# Patient Record
Sex: FEMALE | Race: WHITE | Employment: FULL TIME | URBAN - METROPOLITAN AREA
[De-identification: names, ages, dates, MRNs, and addresses within clinical notes are randomized per-mention and may not be internally consistent; named-entity substitution may affect disease eponyms.]

---

## 2017-01-21 ENCOUNTER — LAB ENCOUNTER (OUTPATIENT)
Dept: LAB | Age: 52
End: 2017-01-21
Attending: FAMILY MEDICINE
Payer: COMMERCIAL

## 2017-01-21 ENCOUNTER — HOSPITAL ENCOUNTER (OUTPATIENT)
Dept: ULTRASOUND IMAGING | Age: 52
Discharge: HOME | End: 2017-01-21
Attending: FAMILY MEDICINE
Payer: COMMERCIAL

## 2017-01-21 DIAGNOSIS — E78.00 HIGH CHOLESTEROL: ICD-10-CM

## 2017-01-21 DIAGNOSIS — R79.89 ELEVATED LIVER FUNCTION TESTS: ICD-10-CM

## 2017-01-21 LAB
ALBUMIN SERPL BCP-MCNC: 4 G/DL (ref 3.5–4.8)
ALBUMIN/GLOB SERPL: 1.3 {RATIO} (ref 1–2)
ALP SERPL-CCNC: 56 U/L (ref 32–100)
ALT SERPL-CCNC: 22 U/L (ref 14–54)
ANION GAP SERPL CALC-SCNC: 8 MMOL/L (ref 0–18)
AST SERPL-CCNC: 22 U/L (ref 15–41)
BASOPHILS # BLD: 0 K/UL (ref 0–0.2)
BASOPHILS NFR BLD: 1 %
BILIRUB SERPL-MCNC: 0.8 MG/DL (ref 0.3–1.2)
BUN SERPL-MCNC: 13 MG/DL (ref 8–20)
BUN/CREAT SERPL: 17.3 (ref 10–20)
CALCIUM SERPL-MCNC: 9.3 MG/DL (ref 8.5–10.5)
CHLORIDE SERPL-SCNC: 106 MMOL/L (ref 95–110)
CHOLEST SERPL-MCNC: 164 MG/DL (ref 110–200)
CO2 SERPL-SCNC: 25 MMOL/L (ref 22–32)
CREAT SERPL-MCNC: 0.75 MG/DL (ref 0.5–1.5)
EOSINOPHIL # BLD: 0 K/UL (ref 0–0.7)
EOSINOPHIL NFR BLD: 1 %
ERYTHROCYTE [DISTWIDTH] IN BLOOD BY AUTOMATED COUNT: 13.6 % (ref 11–15)
GLOBULIN PLAS-MCNC: 3 G/DL (ref 2.5–3.7)
GLUCOSE SERPL-MCNC: 91 MG/DL (ref 70–99)
HCT VFR BLD AUTO: 40.8 % (ref 35–48)
HDLC SERPL-MCNC: 61 MG/DL
HGB BLD-MCNC: 13.5 G/DL (ref 12–16)
LDLC SERPL CALC-MCNC: 94 MG/DL (ref 0–99)
LYMPHOCYTES # BLD: 1.4 K/UL (ref 1–4)
LYMPHOCYTES NFR BLD: 49 %
MCH RBC QN AUTO: 31.5 PG (ref 27–32)
MCHC RBC AUTO-ENTMCNC: 33 G/DL (ref 32–37)
MCV RBC AUTO: 95.6 FL (ref 80–100)
MONOCYTES # BLD: 0.3 K/UL (ref 0–1)
MONOCYTES NFR BLD: 12 %
NEUTROPHILS # BLD AUTO: 1.1 K/UL (ref 1.8–7.7)
NEUTROPHILS NFR BLD: 37 %
NONHDLC SERPL-MCNC: 103 MG/DL
OSMOLALITY UR CALC.SUM OF ELEC: 288 MOSM/KG (ref 275–295)
PLATELET # BLD AUTO: 244 K/UL (ref 140–400)
PMV BLD AUTO: 9.5 FL (ref 7.4–10.3)
POTASSIUM SERPL-SCNC: 3.8 MMOL/L (ref 3.3–5.1)
PROT SERPL-MCNC: 7 G/DL (ref 5.9–8.4)
RBC # BLD AUTO: 4.26 M/UL (ref 3.7–5.4)
SODIUM SERPL-SCNC: 139 MMOL/L (ref 136–144)
TRIGL SERPL-MCNC: 44 MG/DL (ref 1–149)
WBC # BLD AUTO: 2.8 K/UL (ref 4–11)

## 2017-01-21 PROCEDURE — 85025 COMPLETE CBC W/AUTO DIFF WBC: CPT

## 2017-01-21 PROCEDURE — 36415 COLL VENOUS BLD VENIPUNCTURE: CPT

## 2017-01-21 PROCEDURE — 76705 ECHO EXAM OF ABDOMEN: CPT

## 2017-01-21 PROCEDURE — 80061 LIPID PANEL: CPT

## 2017-01-21 PROCEDURE — 80053 COMPREHEN METABOLIC PANEL: CPT

## 2017-01-24 ENCOUNTER — TELEPHONE (OUTPATIENT)
Dept: FAMILY MEDICINE CLINIC | Facility: CLINIC | Age: 52
End: 2017-01-24

## 2017-01-24 DIAGNOSIS — D72.819 LEUKOPENIA, UNSPECIFIED TYPE: Primary | ICD-10-CM

## 2017-01-24 DIAGNOSIS — R79.89 ELEVATED LIVER FUNCTION TESTS: ICD-10-CM

## 2017-01-24 NOTE — TELEPHONE ENCOUNTER
Spoke to patient; [place] airport [place]. Patient informed of results, and given MD's recommendations. Patient verbalized understanding with intent to comply. Questions answered.  Upon her return, she is to have hiv and hepatit

## 2017-01-24 NOTE — TELEPHONE ENCOUNTER
Notes Recorded by Steven Merritt DO on 1/24/2017 at 1:19 PM  Patient has benign hemangioma of the liver which is something she has had for a while.  It was on the MRI she had in 2014. Katty Graft is nothing to be done about it, as it does not cause harm.

## 2017-01-24 NOTE — TELEPHONE ENCOUNTER
----- Message from Amanda Thompson DO sent at 1/24/2017  1:21 PM CST -----  White count is low. This can be due to viral illnesses. Problem is she had a mildly low white count 11 months ago.   I would check HIV test hepatitis testing and send the patie

## 2017-02-08 ENCOUNTER — APPOINTMENT (OUTPATIENT)
Dept: LAB | Age: 52
End: 2017-02-08
Attending: FAMILY MEDICINE
Payer: COMMERCIAL

## 2017-02-08 DIAGNOSIS — R79.89 ELEVATED LIVER FUNCTION TESTS: ICD-10-CM

## 2017-02-08 LAB — FERRITIN SERPL IA-MCNC: 48 NG/ML (ref 11–307)

## 2017-02-08 PROCEDURE — 36415 COLL VENOUS BLD VENIPUNCTURE: CPT

## 2017-02-08 PROCEDURE — 80074 ACUTE HEPATITIS PANEL: CPT

## 2017-02-08 PROCEDURE — 82728 ASSAY OF FERRITIN: CPT

## 2017-02-08 PROCEDURE — 87389 HIV-1 AG W/HIV-1&-2 AB AG IA: CPT

## 2017-02-09 LAB
HAV IGM SERPL QL IA: NONREACTIVE
HBV CORE IGM SERPL QL IA: NONREACTIVE
HBV SURFACE AG SERPL QL IA: NONREACTIVE
HCV AB SERPL QL IA: NONREACTIVE
HIV1+2 AB SERPL QL IA: NONREACTIVE

## 2017-03-17 RX ORDER — LEVOTHYROXINE SODIUM 0.05 MG/1
TABLET ORAL
Qty: 30 TABLET | Refills: 11 | Status: SHIPPED | OUTPATIENT
Start: 2017-03-17 | End: 2017-07-20

## 2017-03-17 RX ORDER — LOSARTAN POTASSIUM 50 MG/1
TABLET ORAL
Qty: 30 TABLET | Refills: 11 | Status: SHIPPED | OUTPATIENT
Start: 2017-03-17 | End: 2018-03-22

## 2017-05-16 ENCOUNTER — TELEPHONE (OUTPATIENT)
Dept: NEUROLOGY | Facility: CLINIC | Age: 52
End: 2017-05-16

## 2017-05-31 ENCOUNTER — LAB ENCOUNTER (OUTPATIENT)
Dept: LAB | Facility: HOSPITAL | Age: 52
End: 2017-05-31
Attending: OBSTETRICS & GYNECOLOGY
Payer: COMMERCIAL

## 2017-05-31 ENCOUNTER — TELEPHONE (OUTPATIENT)
Dept: OBGYN CLINIC | Facility: CLINIC | Age: 52
End: 2017-05-31

## 2017-05-31 DIAGNOSIS — R30.9 PAINFUL URINATION: ICD-10-CM

## 2017-05-31 DIAGNOSIS — R30.9 PAINFUL URINATION: Primary | ICD-10-CM

## 2017-05-31 PROCEDURE — 81001 URINALYSIS AUTO W/SCOPE: CPT

## 2017-05-31 PROCEDURE — 87088 URINE BACTERIA CULTURE: CPT

## 2017-05-31 PROCEDURE — 87186 SC STD MICRODIL/AGAR DIL: CPT

## 2017-05-31 PROCEDURE — 87086 URINE CULTURE/COLONY COUNT: CPT

## 2017-05-31 NOTE — TELEPHONE ENCOUNTER
Pt has been having vaginal for past few days, she states it feels like her \" insides are falling out\" she has to urinate a lot and cramping she would like to know can she see Dr. Arminda Marin on Friday no openings, pt cannot come in tomorrow she will not be in t

## 2017-05-31 NOTE — TELEPHONE ENCOUNTER
Pt states it feels like she has an UTI. She states she has pressure when she urinates, urgency, frequency, cramping with urination, painful urination, and lower back pain. Denies an odor and a fever. Denies any blood. Pt will do an UA with reflex.   Inf

## 2017-06-01 ENCOUNTER — TELEPHONE (OUTPATIENT)
Dept: OBGYN CLINIC | Facility: CLINIC | Age: 52
End: 2017-06-01

## 2017-06-01 NOTE — TELEPHONE ENCOUNTER
Pt informed of [facility] recs & verbalized understanding. Pt asking if UA results are in yet. Pt informed that UA results are in & results will be sent to [address] [place] [address] to review. Pt stated that if results are in, is it necessary for her to come in for an appt still.  Pt

## 2017-06-02 RX ORDER — SULFAMETHOXAZOLE AND TRIMETHOPRIM 800; 160 MG/1; MG/1
1 TABLET ORAL 2 TIMES DAILY
Qty: 6 TABLET | Refills: 0 | Status: SHIPPED | OUTPATIENT
Start: 2017-06-02 | End: 2017-06-05

## 2017-06-02 NOTE — TELEPHONE ENCOUNTER
Pt informed of recs below and verbalized understanding. Spoke with pt in regards to e-coli UTI and that pt needs to be sure she is wiping from front to back. Pt verbalized understanding.   Pt instructed to call office if no relief of sx's after finishing ba

## 2017-06-02 NOTE — TELEPHONE ENCOUNTER
LMTCB. Pts urine culture shows e-coli UTI. Reviewed with NJG in office and verbal given for pt to start Bactrim DS BID x 3 days. NJG stated that we need to review with pt e-coli can be caused from wiping inappropriately & from rectal IC.  Pt needs to be edu

## 2017-07-20 ENCOUNTER — OFFICE VISIT (OUTPATIENT)
Dept: OBGYN CLINIC | Facility: CLINIC | Age: 52
End: 2017-07-20

## 2017-07-20 VITALS
DIASTOLIC BLOOD PRESSURE: 74 MMHG | WEIGHT: 155 LBS | BODY MASS INDEX: 32.54 KG/M2 | HEART RATE: 69 BPM | SYSTOLIC BLOOD PRESSURE: 111 MMHG | HEIGHT: 58 IN

## 2017-07-20 DIAGNOSIS — Z78.0 MENOPAUSE: ICD-10-CM

## 2017-07-20 DIAGNOSIS — Z01.419 ENCOUNTER FOR GYNECOLOGICAL EXAMINATION WITHOUT ABNORMAL FINDING: Primary | ICD-10-CM

## 2017-07-20 DIAGNOSIS — Z12.31 VISIT FOR SCREENING MAMMOGRAM: ICD-10-CM

## 2017-07-20 PROCEDURE — 99396 PREV VISIT EST AGE 40-64: CPT | Performed by: OBSTETRICS & GYNECOLOGY

## 2017-07-20 PROCEDURE — 99213 OFFICE O/P EST LOW 20 MIN: CPT | Performed by: OBSTETRICS & GYNECOLOGY

## 2017-07-20 RX ORDER — ESTRADIOL 0.05 MG/D
1 FILM, EXTENDED RELEASE TRANSDERMAL
Qty: 8 PATCH | Refills: 1 | Status: SHIPPED | OUTPATIENT
Start: 2017-07-20 | End: 2017-08-24

## 2017-07-20 NOTE — PROGRESS NOTES
Lorenz Aschoff is a 47year old female  No LMP recorded. Patient is not currently having periods (Reason: Menopause). Patient presents with:  Gyn Exam: ANNUAL EXAM  Menopause: bad hotflashes. Tried over the counter meds w/ o success.  Taking trazad COLONOSCOPY  1998: KNEE ARTHROSCOPY Right      Comment: R knee arthroscopy  7/2008: MAMMOGRAM, BOTH BREASTS      Comment: abnormal mammogram, breast cyst  7/2006: MAMMOGRAM, BOTH BREASTS      Comment: abnormal mammogram, aspiration of breast cyst  7/2006: TraZODone HCl (DESYREL) 50 MG Oral Tab, 50 mg nightly., Disp: , Rfl:   •  Levothyroxine Sodium (SYNTHROID) 50 MCG Oral Tab, , Disp: , Rfl:     ALLERGIES:  No Known Allergies      Review of Systems:  Constitutional:  Denies fatigue, night sweats, hot flashe Assessment & Plan:  Maldonado Mclean was seen today for gyn exam and menopause.     Diagnoses and all orders for this visit:    Encounter for gynecological examination without abnormal finding    Menopause    Visit for screening mammogram  -     TELMA SCREENING BILAT

## 2017-08-08 ENCOUNTER — TELEPHONE (OUTPATIENT)
Dept: OBGYN CLINIC | Facility: CLINIC | Age: 52
End: 2017-08-08

## 2017-08-08 NOTE — TELEPHONE ENCOUNTER
Pt states the patch caused her a constant HA and she was squinting for 3 days straight. Pt started taking Vitamin E and that \"seems to be helping\". Pt states she does not want to take anything right now and does not want to be seen.  Pt encouraged to call

## 2017-08-08 NOTE — TELEPHONE ENCOUNTER
Pt cancelled appt for f/u on 8/31 with NJG [place] 11:40am. States that \"the patch was too much I had to take it off and im dealing with it now\" pt is taking vitamin E and it seems to be helping her.

## 2017-08-24 ENCOUNTER — OFFICE VISIT (OUTPATIENT)
Dept: NEUROLOGY | Facility: CLINIC | Age: 52
End: 2017-08-24

## 2017-08-24 VITALS
WEIGHT: 148 LBS | BODY MASS INDEX: 29.06 KG/M2 | RESPIRATION RATE: 16 BRPM | SYSTOLIC BLOOD PRESSURE: 112 MMHG | HEIGHT: 60 IN | DIASTOLIC BLOOD PRESSURE: 76 MMHG | HEART RATE: 76 BPM

## 2017-08-24 DIAGNOSIS — M54.16 LUMBAR RADICULOPATHY: Primary | ICD-10-CM

## 2017-08-24 PROCEDURE — 99214 OFFICE O/P EST MOD 30 MIN: CPT | Performed by: OTHER

## 2017-08-24 RX ORDER — PREGABALIN 75 MG/1
75 CAPSULE ORAL 2 TIMES DAILY
Qty: 60 CAPSULE | Refills: 5 | Status: SHIPPED | OUTPATIENT
Start: 2017-08-24 | End: 2017-09-23

## 2017-08-24 NOTE — PROGRESS NOTES
Shelia Greer : 1965     HPI:   Patient presents with:  Numbness: LOV: 5/19/15. F/U on left leg pain.  Patient states that she has been experiencing pain in left calf for years, she feels it may be coming from her back as she has soreness in her current facility-administered medications for this visit.     Past Medical History:   Diagnosis Date   • Abnormal mammogram 2008   • Abnormal mammogram 2006    aspiration  of breast cyst    • ACL tear     1998 R knee arthroscopy   • Anxiety state, unspecifi education:                 Number of children:               Social History Main Topics    Smoking status: Never Smoker                                                                Smokeless tobacco: Never Used                        Alcohol use:  Yes and pin prick in the UE and LE. Stereognosis intact  DTR: 2+ in the upper and lower extremities,  No Babinski, no hoffmans, no clonus  Coordination: Finger to nose, heel to shin intact bilaterally. Gait: Narrow based, negative Romberg’s sign.  Can stand

## 2017-08-28 ENCOUNTER — TELEPHONE (OUTPATIENT)
Dept: NEUROLOGY | Facility: CLINIC | Age: 52
End: 2017-08-28

## 2017-08-28 DIAGNOSIS — G62.9 NEUROPATHY: ICD-10-CM

## 2017-08-28 DIAGNOSIS — M48.061 LUMBAR STENOSIS: ICD-10-CM

## 2017-08-28 DIAGNOSIS — G57.30 PERONEAL NEUROPATHY, UNSPECIFIED LATERALITY: ICD-10-CM

## 2017-08-28 DIAGNOSIS — M54.16 LUMBAR RADICULOPATHY: ICD-10-CM

## 2017-08-28 RX ORDER — GABAPENTIN 100 MG/1
100 CAPSULE ORAL 3 TIMES DAILY
Qty: 90 CAPSULE | Refills: 1 | Status: SHIPPED | OUTPATIENT
Start: 2017-08-28 | End: 2018-08-21 | Stop reason: ALTCHOICE

## 2017-08-28 NOTE — TELEPHONE ENCOUNTER
Spoke to patient. She states that Lyrica is too costly for her so she would like to go back on gabapentin. She states that previously she was taking 4 caps nightly which made her sleepy but she would like to try the medication again.  She is not sure how of

## 2017-10-23 ENCOUNTER — TELEPHONE (OUTPATIENT)
Dept: OBGYN CLINIC | Facility: CLINIC | Age: 52
End: 2017-10-23

## 2017-10-23 NOTE — TELEPHONE ENCOUNTER
C/O DEVELOPED A LUMP INSIDE THE VAGINA THAT HAS NEVER BEEN THERE BEFORE. STATES IT IS NOT PAINFUL BUT SHE IS CONCERNED. ASKED FOR AN APPT IN [place] SO I SCHEDULED HER FOR THUR AT [place] WITH JULIO CESAR.

## 2017-10-26 ENCOUNTER — OFFICE VISIT (OUTPATIENT)
Dept: OBGYN CLINIC | Facility: CLINIC | Age: 52
End: 2017-10-26

## 2017-10-26 VITALS — HEART RATE: 70 BPM | DIASTOLIC BLOOD PRESSURE: 83 MMHG | SYSTOLIC BLOOD PRESSURE: 146 MMHG

## 2017-10-26 DIAGNOSIS — N81.11 CYSTOCELE, MIDLINE: Primary | ICD-10-CM

## 2017-10-26 PROCEDURE — 99213 OFFICE O/P EST LOW 20 MIN: CPT | Performed by: OBSTETRICS & GYNECOLOGY

## 2017-10-30 NOTE — PROGRESS NOTES
Maddi Nicole is a 47year old female  No LMP recorded. Patient is not currently having periods (Reason: Menopause). Patient presents with:  Gyn Problem: GENITAL LUMP -- painless -- noted while cleaning self  .      OBSTETRICS HISTORY:  Obstetric mammogram  Obstetric History     T2    L2    SAB0  TAB0  Ectopic0  Multiple0  Live Births2        SOCIAL HISTORY:    Social History  Social History   Marital status:   Spouse name: N/A    Years of education: N/A  Number of children: N/A nourished  Head/Face: normocephalic  Abdomen:  soft, nontender, nondistended, no masses  Skin/Hair: no unusual rashes or bruises  Extremities: no edema, no cyanosis  Psychiatric:  Oriented to time, place, person and situation.  Appropriate mood and affect

## 2017-11-20 ENCOUNTER — TELEPHONE (OUTPATIENT)
Dept: OBGYN CLINIC | Facility: CLINIC | Age: 52
End: 2017-11-20

## 2017-11-20 NOTE — TELEPHONE ENCOUNTER
PER PT STATE SHE HAS A YEAST INFECTION / PT WANT TO KNOW IF DR COULD PRESCRIBE MEDICATION FOR THAT ONE  PILL FOR YEAST INFECTION  / PLS ADV

## 2017-11-20 NOTE — TELEPHONE ENCOUNTER
I do not treat just vaginal discharge. If worried re: future yeast infection, can take live yogurt orally to treat.

## 2017-11-20 NOTE — TELEPHONE ENCOUNTER
Pt calling to report that her  was on a high dose of antibiotics and got a yeast infection and now pt has one. Pt stated that her  took \"one pink pill\" to get rid of yeast infection and pt would like diflucan as well.  Pt stated she has vagi

## 2017-11-20 NOTE — TELEPHONE ENCOUNTER
Pt informed of NJGs recs below and verbalized understanding. Encouraged pt to call if notices any vaginal odor or itching along with discharge. Pt verbalized understanding.

## 2017-12-11 ENCOUNTER — NURSE TRIAGE (OUTPATIENT)
Dept: FAMILY MEDICINE CLINIC | Facility: CLINIC | Age: 52
End: 2017-12-11

## 2017-12-11 ENCOUNTER — OFFICE VISIT (OUTPATIENT)
Dept: FAMILY MEDICINE CLINIC | Facility: CLINIC | Age: 52
End: 2017-12-11

## 2017-12-11 VITALS
TEMPERATURE: 99 F | BODY MASS INDEX: 30.43 KG/M2 | SYSTOLIC BLOOD PRESSURE: 119 MMHG | WEIGHT: 155 LBS | DIASTOLIC BLOOD PRESSURE: 78 MMHG | HEIGHT: 60 IN | HEART RATE: 81 BPM

## 2017-12-11 DIAGNOSIS — R05.9 COUGH: ICD-10-CM

## 2017-12-11 DIAGNOSIS — J40 BRONCHITIS: Primary | ICD-10-CM

## 2017-12-11 PROCEDURE — 99212 OFFICE O/P EST SF 10 MIN: CPT | Performed by: FAMILY MEDICINE

## 2017-12-11 PROCEDURE — 99213 OFFICE O/P EST LOW 20 MIN: CPT | Performed by: FAMILY MEDICINE

## 2017-12-11 RX ORDER — ALBUTEROL SULFATE 90 UG/1
2 AEROSOL, METERED RESPIRATORY (INHALATION) EVERY 6 HOURS PRN
Qty: 1 INHALER | Refills: 0 | Status: SHIPPED | OUTPATIENT
Start: 2017-12-11 | End: 2018-01-05

## 2017-12-11 RX ORDER — PREDNISONE 20 MG/1
TABLET ORAL
Qty: 10 TABLET | Refills: 0 | Status: SHIPPED | OUTPATIENT
Start: 2017-12-11 | End: 2018-01-05

## 2017-12-11 NOTE — TELEPHONE ENCOUNTER
Action Requested: Summary for Provider     []  Critical Lab, Recommendations Needed  [] Need Additional Advice  []   FYI    []   Need Orders  [] Need Medications Sent to Pharmacy  []  Other     SUMMARY: OV CREATED, cough, fever, brown sputum    Pt states f

## 2017-12-11 NOTE — PATIENT INSTRUCTIONS
Take the prednisone either at breakfast or lunch. Avoided at night otherwise he will have a hard time sleeping. While you are sick take the Ventolin and do 2 puffs 3 times daily as needed.   Go ahead and take her cough medicine at nighttime per Dr. Cherie Alfred

## 2017-12-11 NOTE — PROGRESS NOTES
Patient ID: Kwaku Bernal is a 47year old female.     HPI  Patient presents with:  Cough  Fever    Action Requested: Summary for Provider     []  Critical Lab, Recommendations Needed  [] Need Additional Advice  []   FYI    []   Need Orders  [] Need Med (started that way. ). Respiratory: Positive for cough and wheezing (last week only. ). Negative for shortness of breath. Cardiovascular: Negative for chest pain.          Past Medical History:   Diagnosis Date   • Abnormal mammogram 2008   • Abnormal Levothyroxine Sodium (SYNTHROID) 50 MCG Oral Tab Take 50 mcg by mouth before breakfast.   Disp:  Rfl:    Sertraline HCl (ZOLOFT) 50 MG Oral Tab 50 mg daily. Disp:  Rfl:    TraZODone HCl (DESYREL) 50 MG Oral Tab 50 mg nightly.  Disp:  Rfl:      Allergies:N and try her on prednisone and albuterol. She has a cough syrup at home already. I told her if she is not feeling much better by Wednesday or Thursday to please let me or Dr. Naz Scott know.   Cough  -     Albuterol Sulfate  (90 Base) MCG/ACT Inhalat

## 2017-12-14 ENCOUNTER — TELEPHONE (OUTPATIENT)
Dept: OTHER | Age: 52
End: 2017-12-14

## 2017-12-14 RX ORDER — DOXYCYCLINE HYCLATE 100 MG/1
100 CAPSULE ORAL 2 TIMES DAILY
Qty: 20 CAPSULE | Refills: 0 | Status: SHIPPED | OUTPATIENT
Start: 2017-12-14 | End: 2017-12-24

## 2017-12-14 NOTE — TELEPHONE ENCOUNTER
I sent in doxycycline 100 mg twice daily for 10 days which is an antibiotic. She still does not feel better within about a week she should be seen.

## 2017-12-14 NOTE — TELEPHONE ENCOUNTER
Please reply to pool: EM RN TRIAGE    Patient asking for an antibiotic      Patient was seen on 12/11/17 and stated she  is at the end of the Prednisone and is not feeling better. Continues to have sinus congestion and coughing up brown phlegm.    Pritesh

## 2017-12-15 ENCOUNTER — TELEPHONE (OUTPATIENT)
Dept: OTHER | Age: 52
End: 2017-12-15

## 2017-12-15 NOTE — TELEPHONE ENCOUNTER
Pt requesting a refill of cheratussin - ac  For her cough. Would like it to be sent to [facility] on file    LOV 12/11/17  Bronchitis    If approved Please respond to pool: STEPHANIE ESCALANTEO LPN/BOY so can be phoned in.

## 2017-12-16 RX ORDER — CODEINE PHOSPHATE AND GUAIFENESIN 10; 100 MG/5ML; MG/5ML
5 SOLUTION ORAL EVERY 6 HOURS PRN
Qty: 120 ML | Refills: 0 | OUTPATIENT
Start: 2017-12-16 | End: 2018-01-05

## 2017-12-18 NOTE — TELEPHONE ENCOUNTER
Called [facility] and verified Cheratussin AC has not been called in or faxed yet. Verbal given to Shaw Guthrie at [facility] on file. Pt informed.

## 2017-12-20 ENCOUNTER — TELEPHONE (OUTPATIENT)
Dept: OTHER | Age: 52
End: 2017-12-20

## 2017-12-20 NOTE — TELEPHONE ENCOUNTER
Pt was seen 12/11 Dx cough/cold(bronchitis)  Tx Doxycycline have  taken 7 days but becomes very nauseated after taking it,vomited on Saturday / Sunday ,nauseated today,coughing while speaking   Asking if she needs another ABX or was that enough converge

## 2017-12-20 NOTE — TELEPHONE ENCOUNTER
Pt returning call and informed of DBC's response below. Pt states has had slight improvement in productive cough.  Was asking pt further questions to give [facility] update on symptoms to see if another antibiotic might be appropriate, but pt then hurriedly stated

## 2017-12-28 ENCOUNTER — APPOINTMENT (OUTPATIENT)
Dept: GENERAL RADIOLOGY | Facility: HOSPITAL | Age: 52
End: 2017-12-28
Attending: PHYSICIAN ASSISTANT
Payer: COMMERCIAL

## 2017-12-28 ENCOUNTER — APPOINTMENT (OUTPATIENT)
Dept: CT IMAGING | Facility: HOSPITAL | Age: 52
End: 2017-12-28
Attending: PHYSICIAN ASSISTANT
Payer: COMMERCIAL

## 2017-12-28 ENCOUNTER — HOSPITAL ENCOUNTER (EMERGENCY)
Facility: HOSPITAL | Age: 52
Discharge: HOME | End: 2017-12-28
Attending: PHYSICIAN ASSISTANT
Payer: COMMERCIAL

## 2017-12-28 VITALS
WEIGHT: 155 LBS | HEART RATE: 63 BPM | RESPIRATION RATE: 18 BRPM | TEMPERATURE: 97 F | BODY MASS INDEX: 30 KG/M2 | SYSTOLIC BLOOD PRESSURE: 121 MMHG | DIASTOLIC BLOOD PRESSURE: 95 MMHG | OXYGEN SATURATION: 96 %

## 2017-12-28 DIAGNOSIS — S16.1XXA STRAIN OF NECK MUSCLE, INITIAL ENCOUNTER: ICD-10-CM

## 2017-12-28 DIAGNOSIS — S09.90XA CLOSED HEAD INJURY WITHOUT LOSS OF CONSCIOUSNESS, INITIAL ENCOUNTER: ICD-10-CM

## 2017-12-28 DIAGNOSIS — S39.012A LUMBAR STRAIN, INITIAL ENCOUNTER: Primary | ICD-10-CM

## 2017-12-28 PROCEDURE — 72040 X-RAY EXAM NECK SPINE 2-3 VW: CPT | Performed by: PHYSICIAN ASSISTANT

## 2017-12-28 PROCEDURE — 72100 X-RAY EXAM L-S SPINE 2/3 VWS: CPT | Performed by: PHYSICIAN ASSISTANT

## 2017-12-28 PROCEDURE — 99284 EMERGENCY DEPT VISIT MOD MDM: CPT

## 2017-12-28 PROCEDURE — 70450 CT HEAD/BRAIN W/O DYE: CPT | Performed by: PHYSICIAN ASSISTANT

## 2017-12-28 PROCEDURE — 96372 THER/PROPH/DIAG INJ SC/IM: CPT

## 2017-12-28 RX ORDER — DIAZEPAM 5 MG/1
5 TABLET ORAL 3 TIMES DAILY PRN
Qty: 12 TABLET | Refills: 0 | Status: SHIPPED | OUTPATIENT
Start: 2017-12-28 | End: 2018-01-04

## 2017-12-28 RX ORDER — NAPROXEN 500 MG/1
500 TABLET ORAL 2 TIMES DAILY WITH MEALS
Qty: 14 TABLET | Refills: 0 | Status: SHIPPED | OUTPATIENT
Start: 2017-12-28 | End: 2018-01-04

## 2017-12-28 RX ORDER — DIAZEPAM 5 MG/1
5 TABLET ORAL ONCE
Status: COMPLETED | OUTPATIENT
Start: 2017-12-28 | End: 2017-12-28

## 2017-12-28 RX ORDER — KETOROLAC TROMETHAMINE 30 MG/ML
60 INJECTION, SOLUTION INTRAMUSCULAR; INTRAVENOUS ONCE
Status: COMPLETED | OUTPATIENT
Start: 2017-12-28 | End: 2017-12-28

## 2017-12-28 NOTE — ED INITIAL ASSESSMENT (HPI)
Patient c/o lower back and neck pain s/p fall down escalator on 12/26. Denies loc. Denies anticoagulants.

## 2017-12-28 NOTE — ED NOTES
Care assumed from triage Alert and interactive S/P witnessed fall while on stationary escalator 48 hours ago No LOC Presents with lumbar back pain that radiates down LLE In addition verbalizes R lateral neck pain with generalized HA and \"fogginess\" Alert

## 2017-12-29 NOTE — ED NOTES
Pt dcd to home with family, ambulatory with steady gait,denies pain at this time, discharge instruction given and voices understanding, prescription given, denies any concern

## 2017-12-29 NOTE — ED PROVIDER NOTES
Patient Seen in: [facility] Emergency Department    History   Patient presents with:  Fall (musculoskeletal, neurologic)    Stated Complaint: fall, head injury 2 days ago    HPI       56-year-old female presents with chief complaint of midline low irregular menstrual                cycle  1991:   1989:   2010: COLONOSCOPY  1998: KNEE ARTHROSCOPY Right      Comment: R knee arthroscopy  2008: MAMMOGRAM, BOTH BREASTS      Comment: abnormal mammogram, breast cyst  2006: MAMMOGRAM, Smokeless tobacco: Never Used                      Alcohol use: Yes              Comment: 2-3 times per month.       Review of Systems    Positive for stated complaint: fall, head injury 2 days ag lymphadenopathy noted. Musculoskeletal: Back - Normal to inspection. Positive tenderness to palpation at mid lumbar spine. No CVA tenderness bilaterally. Decreased range of motion of lumbar spine due to reported pain. No palpable muscle spasm.   Remain by mouth 2 (two) times daily with meals. , Normal, Disp-14 tablet, R-0    diazepam 5 MG Oral Tab  Take 1 tablet (5 mg total) by mouth 3 (three) times daily as needed (Muscle spasm). , Print Script, Disp-12 tablet, R-0

## 2018-01-05 ENCOUNTER — OFFICE VISIT (OUTPATIENT)
Dept: FAMILY MEDICINE CLINIC | Facility: CLINIC | Age: 53
End: 2018-01-05

## 2018-01-05 VITALS
HEIGHT: 60 IN | WEIGHT: 155 LBS | BODY MASS INDEX: 30.43 KG/M2 | DIASTOLIC BLOOD PRESSURE: 81 MMHG | SYSTOLIC BLOOD PRESSURE: 115 MMHG | HEART RATE: 80 BPM
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
## 2018-01-05 DIAGNOSIS — M54.42 ACUTE BILATERAL LOW BACK PAIN WITH LEFT-SIDED SCIATICA: Primary | ICD-10-CM

## 2018-01-05 PROCEDURE — 99214 OFFICE O/P EST MOD 30 MIN: CPT | Performed by: FAMILY MEDICINE

## 2018-01-05 PROCEDURE — 99212 OFFICE O/P EST SF 10 MIN: CPT | Performed by: FAMILY MEDICINE

## 2018-01-05 RX ORDER — CLONAZEPAM 0.5 MG/1
TABLET ORAL
Refills: 4 | COMMUNITY
Start: 2017-12-20

## 2018-01-05 NOTE — PROGRESS NOTES
Julio Public down escalator at [facility] [employer] is broken and people were going up and down the small escalator. \"  She fell upside down. \"People had to help me up. \"    Pt had hx of low back pain with sciatica on the left side.   \"This is different .' patellas and Achilles bilaterally. Dorsalis pedis and popliteal pulses were present bilateral lower extremities. Range of motion of the lumbar spine was limited do to discomfort. Motor strength of the lower extremities is normal 5/5 in both legs.     1.

## 2018-01-11 ENCOUNTER — OFFICE VISIT (OUTPATIENT)
Dept: PHYSICAL THERAPY | Age: 53
End: 2018-01-11
Attending: FAMILY MEDICINE
Payer: COMMERCIAL
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
## 2018-01-11 DIAGNOSIS — M54.42 ACUTE BILATERAL LOW BACK PAIN WITH LEFT-SIDED SCIATICA: ICD-10-CM

## 2018-01-11 PROCEDURE — 97530 THERAPEUTIC ACTIVITIES: CPT | Performed by: PHYSICAL THERAPIST

## 2018-01-11 PROCEDURE — 97162 PT EVAL MOD COMPLEX 30 MIN: CPT | Performed by: PHYSICAL THERAPIST

## 2018-01-11 NOTE — PROGRESS NOTES
P.T. EVALUATION:   Referring Physician: Dr. Mary Maciel  Diagnosis: Fall (on)(from) escalator, subsequent encounter (M27.1KVU)  Acute bilateral low back pain with left-sided sciatica (M54.42)    Date of Onset: 12/26/2017 Date of Service: 1/11/2018     JE Does not present with any major gait deviations. Today’s Treatment and Response:  Patient education provided on PT eval findings, treatment plan, goals, HEP. Patient received today:  - Therapeutic Activity: Instructed on HEP.  Handouts were created and

## 2018-01-16 ENCOUNTER — OFFICE VISIT (OUTPATIENT)
Dept: PHYSICAL THERAPY | Age: 53
End: 2018-01-16
Attending: FAMILY MEDICINE
Payer: COMMERCIAL

## 2018-01-16 ENCOUNTER — APPOINTMENT (OUTPATIENT)
Dept: PHYSICAL THERAPY | Age: 53
End: 2018-01-16
Attending: FAMILY MEDICINE
Payer: COMMERCIAL

## 2018-01-16 PROCEDURE — 97110 THERAPEUTIC EXERCISES: CPT

## 2018-01-16 NOTE — PROGRESS NOTES
Diagnosis: Fall (on)(from) escalator, subsequent encounter (Q26.4YPW)  Acute bilateral low back pain with left-sided sciatica (M54.42)          # of Visits:  2 Calvin Laws         Next MD visit: none scheduled  Fall Risk: standard         Precautions: n

## 2018-01-18 ENCOUNTER — OFFICE VISIT (OUTPATIENT)
Dept: PHYSICAL THERAPY | Age: 53
End: 2018-01-18
Attending: FAMILY MEDICINE
Payer: COMMERCIAL
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
## 2018-01-18 DIAGNOSIS — M54.42 ACUTE BILATERAL LOW BACK PAIN WITH LEFT-SIDED SCIATICA: ICD-10-CM

## 2018-01-18 PROCEDURE — 97110 THERAPEUTIC EXERCISES: CPT

## 2018-01-22 ENCOUNTER — OFFICE VISIT (OUTPATIENT)
Dept: PHYSICAL THERAPY | Age: 53
End: 2018-01-22
Attending: FAMILY MEDICINE
Payer: COMMERCIAL
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
## 2018-01-22 DIAGNOSIS — M54.42 ACUTE BILATERAL LOW BACK PAIN WITH LEFT-SIDED SCIATICA: ICD-10-CM

## 2018-01-22 PROCEDURE — 97110 THERAPEUTIC EXERCISES: CPT

## 2018-01-22 PROCEDURE — 97014 ELECTRIC STIMULATION THERAPY: CPT

## 2018-01-22 NOTE — PROGRESS NOTES
Diagnosis: Fall (on)(from) escalator, subsequent encounter (K97.1XBI)  Acute bilateral low back pain with left-sided sciatica (M54.42)          # of Visits:  [address]         Next MD visit: none scheduled  Fall Risk: standard         Precautions: n

## 2018-01-24 ENCOUNTER — OFFICE VISIT (OUTPATIENT)
Dept: PHYSICAL THERAPY | Age: 53
End: 2018-01-24
Attending: FAMILY MEDICINE
Payer: COMMERCIAL

## 2018-01-24 DIAGNOSIS — M54.42 ACUTE BILATERAL LOW BACK PAIN WITH LEFT-SIDED SCIATICA: ICD-10-CM
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
## 2018-01-24 PROCEDURE — 97110 THERAPEUTIC EXERCISES: CPT

## 2018-01-24 PROCEDURE — 97014 ELECTRIC STIMULATION THERAPY: CPT

## 2018-01-24 NOTE — PROGRESS NOTES
Diagnosis: Fall (on)(from) escalator, subsequent encounter (H92.8ECQ)  Acute bilateral low back pain with left-sided sciatica (M54.42)          # of Visits:  [address] [facility]         Next MD visit: none scheduled  Fall Risk: standard         Precautions: n

## 2018-02-05 ENCOUNTER — OFFICE VISIT (OUTPATIENT)
Dept: PHYSICAL THERAPY | Age: 53
End: 2018-02-05
Attending: FAMILY MEDICINE
Payer: COMMERCIAL

## 2018-02-05 PROCEDURE — 97110 THERAPEUTIC EXERCISES: CPT

## 2018-02-06 ENCOUNTER — OFFICE VISIT (OUTPATIENT)
Dept: FAMILY MEDICINE CLINIC | Facility: CLINIC | Age: 53
End: 2018-02-06

## 2018-02-06 ENCOUNTER — APPOINTMENT (OUTPATIENT)
Dept: PHYSICAL THERAPY | Age: 53
End: 2018-02-06
Attending: FAMILY MEDICINE
Payer: COMMERCIAL

## 2018-02-06 VITALS
TEMPERATURE: 98 F | SYSTOLIC BLOOD PRESSURE: 143 MMHG | HEART RATE: 66 BPM | BODY MASS INDEX: 30 KG/M2 | WEIGHT: 155 LBS | DIASTOLIC BLOOD PRESSURE: 84 MMHG

## 2018-02-06 DIAGNOSIS — M54.16 LUMBAR RADICULOPATHY: Primary | ICD-10-CM

## 2018-02-06 PROCEDURE — 99213 OFFICE O/P EST LOW 20 MIN: CPT | Performed by: FAMILY MEDICINE

## 2018-02-06 PROCEDURE — 99212 OFFICE O/P EST SF 10 MIN: CPT | Performed by: FAMILY MEDICINE

## 2018-02-06 NOTE — PROGRESS NOTES
\"I feel a lot better but I still feel sore. \"  The left calf still hurts a lot. Doing exercises at home  Mri 2014    CONCLUSION:     Scoliosis with multilevel degenerative changes as described above.  Mild  central stenosis at L3-L4.     Pars defect bilat

## 2018-02-08 ENCOUNTER — APPOINTMENT (OUTPATIENT)
Dept: PHYSICAL THERAPY | Age: 53
End: 2018-02-08
Attending: FAMILY MEDICINE
Payer: COMMERCIAL

## 2018-02-13 ENCOUNTER — HOSPITAL ENCOUNTER (OUTPATIENT)
Dept: MRI IMAGING | Age: 53
Discharge: HOME | End: 2018-02-13
Attending: FAMILY MEDICINE
Payer: COMMERCIAL

## 2018-02-13 DIAGNOSIS — M54.16 LUMBAR RADICULOPATHY: ICD-10-CM

## 2018-02-13 PROCEDURE — 72148 MRI LUMBAR SPINE W/O DYE: CPT | Performed by: FAMILY MEDICINE

## 2018-02-20 ENCOUNTER — APPOINTMENT (OUTPATIENT)
Dept: PHYSICAL THERAPY | Age: 53
End: 2018-02-20
Attending: FAMILY MEDICINE
Payer: COMMERCIAL

## 2018-02-21 ENCOUNTER — TELEPHONE (OUTPATIENT)
Dept: OTHER | Age: 53
End: 2018-02-21

## 2018-02-21 NOTE — TELEPHONE ENCOUNTER
Please note. Thank you. Pt calling back (Name and  verified) and informed of PCP message below. Pt states that her back is feeling better since going for PT and that she wants to hold off on the injection with the pain clinic.     Pt states that she w

## 2018-02-21 NOTE — TELEPHONE ENCOUNTER
----- Message from Joy Pham DO sent at 2/20/2018  7:04 PM CST -----  Patient has worsening of the arthritis and slippage of the bones in her lower back. This is causing pinching of the nerves.   Follow-up with the pain management as we discussed

## 2018-03-05 ENCOUNTER — HOSPITAL ENCOUNTER (OUTPATIENT)
Dept: CT IMAGING | Age: 53
Discharge: HOME | End: 2018-03-05
Attending: FAMILY MEDICINE

## 2018-03-05 DIAGNOSIS — Z13.9 ENCOUNTER FOR SCREENING: ICD-10-CM

## 2018-03-05 NOTE — PROGRESS NOTES
Pt seen at [facility]:  PRELIMINARY SCORE= 2.64  BP= 128/80  Cholestec labs as follows: NonFasting  TC= 174  HDL= 45  LDL= 109  TG= 101  GLUCOSE= 91  All results and risk factors discussed with patient; all questions and concerns addressed.   E

## 2018-03-22 RX ORDER — LOSARTAN POTASSIUM 50 MG/1
TABLET ORAL
Qty: 90 TABLET | Refills: 10 | Status: SHIPPED | OUTPATIENT
Start: 2018-03-22 | End: 2019-06-10

## 2018-04-03 RX ORDER — LEVOTHYROXINE SODIUM 0.05 MG/1
TABLET ORAL
Qty: 30 TABLET | Refills: 11 | Status: SHIPPED | OUTPATIENT
Start: 2018-04-03 | End: 2019-04-24

## 2018-04-04 ENCOUNTER — PATIENT MESSAGE (OUTPATIENT)
Dept: FAMILY MEDICINE CLINIC | Facility: CLINIC | Age: 53
End: 2018-04-04

## 2018-04-05 ENCOUNTER — NURSE TRIAGE (OUTPATIENT)
Dept: OTHER | Age: 53
End: 2018-04-05

## 2018-04-05 NOTE — TELEPHONE ENCOUNTER
From: Shreyas Simmons  To: Troy Hernandez DO  Sent: 4/4/2018 12:36 PM CDT  Subject: Non-Urgent Medical Question    Hello All,  I tried to call but wait was to long. I have had tingly on my tongue along with numbness.  It sometimes felt in the beginnin

## 2018-04-05 NOTE — TELEPHONE ENCOUNTER
Action Requested: Summary for Provider     []  Critical Lab, Recommendations Needed  [x] Need Additional Advice  []   FYI    []   Need Orders  [] Need Medications Sent to Pharmacy  []  Other     SUMMARY: Spoke with patient who reports she has had an interm

## 2018-04-05 NOTE — TELEPHONE ENCOUNTER
----- Message from Lelia Lake. Ranulfo sent at 4/4/2018 12:36 PM CDT -----  Regarding: Non-Urgent Medical Question  Contact: [phone]  Hello All,  I tried to call but wait was to long. I have had tingly on my tongue along with numbness.  It sometimes fel

## 2018-04-10 NOTE — TELEPHONE ENCOUNTER
Patient called back. Patient confirmed her symptoms; still has sensation in mouth as described below. No other recommendations provided other than to schedule appt.

## 2018-04-25 PROBLEM — G89.29 CHRONIC PAIN OF RIGHT KNEE: Status: ACTIVE | Noted: 2018-04-25

## 2018-04-25 PROBLEM — M43.17 SPONDYLOLISTHESIS OF LUMBOSACRAL REGION: Status: ACTIVE | Noted: 2018-04-25

## 2018-04-25 PROBLEM — M25.561 CHRONIC PAIN OF RIGHT KNEE: Status: ACTIVE | Noted: 2018-04-25

## 2018-08-08 ENCOUNTER — PATIENT MESSAGE (OUTPATIENT)
Dept: FAMILY MEDICINE CLINIC | Facility: CLINIC | Age: 53
End: 2018-08-08

## 2018-08-09 ENCOUNTER — NURSE TRIAGE (OUTPATIENT)
Dept: OTHER | Age: 53
End: 2018-08-09

## 2018-08-09 NOTE — TELEPHONE ENCOUNTER
Spoke with patient and informed her of both of Dr. Av Garrett' messages and plan of care. Patient voiced understanding and stated she has been using the Cortizone 10 cream and that has not helped.  Patient also reports she takes cetirizine daily and wants to c

## 2018-08-09 NOTE — TELEPHONE ENCOUNTER
----- Message from Minetto. Ranulfo sent at 8/8/2018 11:17 AM CDT -----  Regarding: Prescription Question  Contact: [phone]  HI,  I have been fighting eye allergies for some time now.   I take an over the counter allergy pill and I got the Antihistami

## 2018-08-09 NOTE — TELEPHONE ENCOUNTER
Action Requested: Summary for Provider     []  Critical Lab, Recommendations Needed  [] Need Additional Advice  []   FYI    []   Need Orders  [] Need Medications Sent to Pharmacy  []  Other     SUMMARY: Patient reports she is suffering from seasonal allerg

## 2018-08-09 NOTE — TELEPHONE ENCOUNTER
Yes stop the cetirizine and start the xyzal.  We don't use stronger cortisone around the eyes due to sensitive skin and can be destructive on the eyes.

## 2018-08-09 NOTE — TELEPHONE ENCOUNTER
From: Maryuri Pompa  To: Chato Houston DO  Sent: 8/8/2018 11:17 AM CDT  Subject: Prescription Question    HI,  I have been fighting eye allergies for some time now. I take an over the counter allergy pill and I got the Antihistamine eye drops.  I w

## 2018-08-09 NOTE — TELEPHONE ENCOUNTER
Action Requested: Summary for Provider     []  Critical Lab, Recommendations Needed  [] Need Additional Advice  []   FYI    []   Need Orders  [x] Need Medications Sent to Pharmacy  []  Other     SUMMARY: requesting lotion/ cream to pharmacy to place around

## 2018-08-09 NOTE — TELEPHONE ENCOUNTER
Cant find anything in record  She can try hyrdrocortisone cream otc around the eye but not in the eye.   Will need f/u

## 2018-08-21 ENCOUNTER — OFFICE VISIT (OUTPATIENT)
Dept: FAMILY MEDICINE CLINIC | Facility: CLINIC | Age: 53
End: 2018-08-21
Payer: COMMERCIAL

## 2018-08-21 VITALS
SYSTOLIC BLOOD PRESSURE: 129 MMHG | BODY MASS INDEX: 32 KG/M2 | HEART RATE: 94 BPM | DIASTOLIC BLOOD PRESSURE: 86 MMHG | WEIGHT: 157.63 LBS | TEMPERATURE: 100 F

## 2018-08-21 DIAGNOSIS — R05.9 COUGH: Primary | ICD-10-CM

## 2018-08-21 PROCEDURE — 99212 OFFICE O/P EST SF 10 MIN: CPT | Performed by: FAMILY MEDICINE

## 2018-08-21 PROCEDURE — 99213 OFFICE O/P EST LOW 20 MIN: CPT | Performed by: FAMILY MEDICINE

## 2018-08-21 RX ORDER — AZITHROMYCIN 250 MG/1
TABLET, FILM COATED ORAL
Qty: 6 TABLET | Refills: 0 | Status: SHIPPED | OUTPATIENT
Start: 2018-08-21 | End: 2019-01-06

## 2018-08-21 NOTE — PROGRESS NOTES
HPI:   Sheridan Munoz is a 47year old female who presents for upper respiratory symptoms for  2  days. Patient reports cough with yellow colored sputum. Low grade fever yesterday. Reports her granddaughter was sick.      Current Outpatient Prescription cyst  7/2006: PUNC/ASPIR BREAST CYST      Comment: aspiraton of breast cyst [APPROACH NOT                STATED], abnormal mammogram  2010: UPPER GI ENDOSCOPY,BIOPSY      Comment: EGD with biopsy  7/2008: US BREAST CYST ASPIRATION (CPT=76942/38059)      Co

## 2018-09-13 ENCOUNTER — OFFICE VISIT (OUTPATIENT)
Dept: OBGYN CLINIC | Facility: CLINIC | Age: 53
End: 2018-09-13
Payer: COMMERCIAL

## 2018-09-13 VITALS
WEIGHT: 157 LBS | HEIGHT: 59 IN | DIASTOLIC BLOOD PRESSURE: 77 MMHG | HEART RATE: 66 BPM | BODY MASS INDEX: 31.65 KG/M2 | SYSTOLIC BLOOD PRESSURE: 135 MMHG

## 2018-09-13 DIAGNOSIS — Z12.31 VISIT FOR SCREENING MAMMOGRAM: ICD-10-CM

## 2018-09-13 DIAGNOSIS — Z01.419 ENCOUNTER FOR GYNECOLOGICAL EXAMINATION WITHOUT ABNORMAL FINDING: Primary | ICD-10-CM

## 2018-09-13 PROCEDURE — 99396 PREV VISIT EST AGE 40-64: CPT | Performed by: OBSTETRICS & GYNECOLOGY

## 2018-09-13 NOTE — PROGRESS NOTES
Cherylene Petri is a 47year old female  No LMP recorded. Patient is not currently having periods (Reason: Menopause). Patient presents with:  Gyn Exam: ANNUAL EXAM -- tried HRT last year -- had HA/ eye issues. Tolerable hot flashes. no  bleed. BREASTS      Comment:  abnormal mammogram, breast cyst  7/2006: MAMMOGRAM, BOTH BREASTS      Comment:  abnormal mammogram, aspiration of breast cyst  7/2006: PUNC/ASPIR BREAST CYST      Comment:  aspiraton of breast cyst [APPROACH NOT STATED], abnormal Grandmother         CAD   • Diabetes Paternal Grandmother    • Hypertension Paternal Grandmother    • Lipids Paternal Grandmother         hyperlipidemia       MEDICATIONS:    Current Outpatient Medications:   •  azithromycin 250 MG Oral Tab, Take two table changes  Abdomen:   soft, nontender, nondistended, no masses  Skin/Hair:  no unusual rashes or bruises  Extremities:  no edema, no cyanosis  Psychiatric:   oriented to time, place, person and situation.  Appropriate mood and affect    Pelvic Exam:  External

## 2018-10-04 ENCOUNTER — TELEPHONE (OUTPATIENT)
Dept: FAMILY MEDICINE CLINIC | Facility: CLINIC | Age: 53
End: 2018-10-04

## 2018-10-04 ENCOUNTER — NURSE TRIAGE (OUTPATIENT)
Dept: FAMILY MEDICINE CLINIC | Facility: CLINIC | Age: 53
End: 2018-10-04

## 2018-10-04 DIAGNOSIS — M79.673 PAIN OF FOOT, UNSPECIFIED LATERALITY: Primary | ICD-10-CM

## 2018-10-04 NOTE — TELEPHONE ENCOUNTER
Action Requested: Summary for Provider     []  Critical Lab, Recommendations Needed  [x] Need Additional Advice  []   FYI    []   Need Orders  [] Need Medications Sent to Pharmacy  []  Other     SUMMARY: Per protocol advised OV within 2 weeks.  Pt asking if

## 2018-10-10 ENCOUNTER — HOSPITAL ENCOUNTER (OUTPATIENT)
Dept: MAMMOGRAPHY | Age: 53
Discharge: HOME | End: 2018-10-10
Attending: OBSTETRICS & GYNECOLOGY
Payer: COMMERCIAL

## 2018-10-10 DIAGNOSIS — Z12.31 VISIT FOR SCREENING MAMMOGRAM: ICD-10-CM

## 2018-10-10 PROCEDURE — 77063 BREAST TOMOSYNTHESIS BI: CPT | Performed by: OBSTETRICS & GYNECOLOGY

## 2018-10-10 PROCEDURE — 77067 SCR MAMMO BI INCL CAD: CPT | Performed by: OBSTETRICS & GYNECOLOGY

## 2018-10-29 ENCOUNTER — TELEPHONE (OUTPATIENT)
Dept: FAMILY MEDICINE CLINIC | Facility: CLINIC | Age: 53
End: 2018-10-29

## 2018-10-29 DIAGNOSIS — E78.00 HIGH CHOLESTEROL: Primary | ICD-10-CM

## 2018-10-29 NOTE — TELEPHONE ENCOUNTER
Per pt, she faxed her blood test result in [place] last 10/12/2018 and nobody call her yet. Or she did not received any in her Mychart either. Pls advise.

## 2018-10-29 NOTE — TELEPHONE ENCOUNTER
Results were received at [facility]. Aia 16 will be in [place] tomorrow.

## 2018-10-30 NOTE — TELEPHONE ENCOUNTER
Patient verbalized understanding of [facility] message. Order for dietician mailed.

## 2018-10-30 NOTE — TELEPHONE ENCOUNTER
Aia 16 has reviewed results, per Aia 16 cholesterol is high patient is to follow up with dietician. Order placed in system. WBC was low. Per Aia 16 patient is to recheck blood test in 3 months. Orders generated.

## 2019-01-05 ENCOUNTER — NURSE TRIAGE (OUTPATIENT)
Dept: OTHER | Age: 54
End: 2019-01-05

## 2019-01-05 NOTE — TELEPHONE ENCOUNTER
Action Requested: Summary for Provider     []  Critical Lab, Recommendations Needed  [] Need Additional Advice  []   FYI    []   Need Orders  [] Need Medications Sent to Pharmacy  []  Other     SUMMARY:Pt requesting to be seen or send RX, c/c Productive co

## 2019-01-06 ENCOUNTER — HOSPITAL ENCOUNTER (OUTPATIENT)
Age: 54
Discharge: HOME | End: 2019-01-06
Attending: EMERGENCY MEDICINE
Payer: COMMERCIAL

## 2019-01-06 ENCOUNTER — APPOINTMENT (OUTPATIENT)
Dept: GENERAL RADIOLOGY | Age: 54
End: 2019-01-06
Attending: EMERGENCY MEDICINE
Payer: COMMERCIAL

## 2019-01-06 VITALS
SYSTOLIC BLOOD PRESSURE: 116 MMHG | BODY MASS INDEX: 32.25 KG/M2 | RESPIRATION RATE: 18 BRPM | TEMPERATURE: 100 F | HEIGHT: 59 IN | DIASTOLIC BLOOD PRESSURE: 83 MMHG | WEIGHT: 160 LBS | HEART RATE: 103 BPM | OXYGEN SATURATION: 95 %

## 2019-01-06 DIAGNOSIS — R05.9 COUGH: Primary | ICD-10-CM

## 2019-01-06 LAB
POCT INFLUENZA A: NEGATIVE
POCT INFLUENZA B: NEGATIVE

## 2019-01-06 PROCEDURE — 99213 OFFICE O/P EST LOW 20 MIN: CPT

## 2019-01-06 PROCEDURE — 87502 INFLUENZA DNA AMP PROBE: CPT | Performed by: EMERGENCY MEDICINE

## 2019-01-06 PROCEDURE — 99214 OFFICE O/P EST MOD 30 MIN: CPT

## 2019-01-06 PROCEDURE — 71046 X-RAY EXAM CHEST 2 VIEWS: CPT | Performed by: EMERGENCY MEDICINE

## 2019-01-06 RX ORDER — CODEINE PHOSPHATE AND GUAIFENESIN 10; 100 MG/5ML; MG/5ML
5 SOLUTION ORAL EVERY 6 HOURS PRN
Qty: 100 ML | Refills: 0 | Status: SHIPPED | OUTPATIENT
Start: 2019-01-06 | End: 2019-03-11 | Stop reason: ALTCHOICE

## 2019-01-06 RX ORDER — AZITHROMYCIN 250 MG/1
TABLET, FILM COATED ORAL
Qty: 1 PACKAGE | Refills: 0 | Status: SHIPPED | OUTPATIENT
Start: 2019-01-06 | End: 2019-01-11

## 2019-01-06 NOTE — ED PROVIDER NOTES
Patient Seen in: [facility] [address] [place] [address]    History   Patient presents with:  Cold    Stated Complaint: cough, congestion    HPI    Patient complains of cough and sore throat which has been present for the past 2 weeks.   The patient NOT STATED], abnormal mammogram   • UPPER GI ENDOSCOPY,BIOPSY  2010    EGD with biopsy   • US BREAST CYST ASPIRATION (CPT=76942/05476)  7/2008    cyst aspiration via u/s, abnormal mammogram       Family history reviewed and is not pertinent to presenting p silhouette abnormality or cardiomegaly. Unremarkable pulmonary vasculature. MEDIAST/BRANDON: Atherosclerotic aorta with no visible aneurysm. LUNGS/PLEURA: No significant pulmonary parenchymal abnormalities. No effusion or pleural thickening.   BONES: Mild

## 2019-01-06 NOTE — ED INITIAL ASSESSMENT (HPI)
Cold like symptoms for 2 weeks. Pt states illness started with sore throat. Pt states then she developed a cough. Pt woke up achy today. Pt tried mucinex with no relief.

## 2019-01-07 NOTE — TELEPHONE ENCOUNTER
Discharge         Home or Self Care                 Follow-Ups: Follow up with Martine Decker DO (Family Medicine) in 3 days (1/9/2019); if symptoms do not improve

## 2019-01-10 NOTE — TELEPHONE ENCOUNTER
Noted  Matthew Hernandez Rn Triage 10 minutes ago (9:34 AM)      Pt contacted and she denies needed to see [facility]. She states that she is doing better.      Routing Comment      Matthew Hrenandez Rn Triage 10 minutes ago (9:34 AM)      Pt contacted and she

## 2019-03-11 ENCOUNTER — OFFICE VISIT (OUTPATIENT)
Dept: PODIATRY CLINIC | Facility: CLINIC | Age: 54
End: 2019-03-11
Payer: COMMERCIAL

## 2019-03-11 DIAGNOSIS — M72.2 PLANTAR FASCIITIS, BILATERAL: Primary | ICD-10-CM

## 2019-03-11 PROCEDURE — 99212 OFFICE O/P EST SF 10 MIN: CPT | Performed by: PODIATRIST

## 2019-03-11 PROCEDURE — 99243 OFF/OP CNSLTJ NEW/EST LOW 30: CPT | Performed by: PODIATRIST

## 2019-03-11 PROCEDURE — L3060 FOOT ARCH SUPP LONGITUD/META: HCPCS | Performed by: PODIATRIST

## 2019-03-11 NOTE — PROGRESS NOTES
HPI:    Patient ID: Maryuri Pompa is a 49year old female. 81-year-old female presents as a new patient to me on consult from [address] [place] [address]. Patient's chief complaint is right heel pain. The pain is been present for at least 2 months.   She thinks armani She is well-informed and will follow-up in 3 weeks. ASSESSMENT/PLAN:   Plantar fasciitis, bilateral  (primary encounter diagnosis)    No orders of the defined types were placed in this encounter.       Meds This Visit:  Requested Prescriptions

## 2019-03-12 ENCOUNTER — OFFICE VISIT (OUTPATIENT)
Dept: FAMILY MEDICINE CLINIC | Facility: CLINIC | Age: 54
End: 2019-03-12
Payer: COMMERCIAL

## 2019-03-12 ENCOUNTER — NURSE TRIAGE (OUTPATIENT)
Dept: OTHER | Age: 54
End: 2019-03-12

## 2019-03-12 VITALS
SYSTOLIC BLOOD PRESSURE: 145 MMHG | HEIGHT: 59 IN | DIASTOLIC BLOOD PRESSURE: 79 MMHG | HEART RATE: 70 BPM | BODY MASS INDEX: 32 KG/M2

## 2019-03-12 DIAGNOSIS — R10.9 LEFT FLANK PAIN: Primary | ICD-10-CM

## 2019-03-12 DIAGNOSIS — M54.9 MID BACK PAIN: ICD-10-CM

## 2019-03-12 LAB
MULTISTIX LOT#: NORMAL NUMERIC
PH, URINE: 6 (ref 4.5–8)
SPECIFIC GRAVITY: 1.02 (ref 1–1.03)
URINE-COLOR: YELLOW
UROBILINOGEN,SEMI-QN: 0.2 MG/DL (ref 0–1.9)

## 2019-03-12 PROCEDURE — 99213 OFFICE O/P EST LOW 20 MIN: CPT | Performed by: NURSE PRACTITIONER

## 2019-03-12 PROCEDURE — 81003 URINALYSIS AUTO W/O SCOPE: CPT | Performed by: NURSE PRACTITIONER

## 2019-03-12 RX ORDER — CYCLOBENZAPRINE HCL 5 MG
5 TABLET ORAL 3 TIMES DAILY PRN
Qty: 30 TABLET | Refills: 0 | Status: SHIPPED | OUTPATIENT
Start: 2019-03-12 | End: 2019-06-25 | Stop reason: ALTCHOICE

## 2019-03-12 RX ORDER — NAPROXEN 500 MG/1
500 TABLET ORAL 2 TIMES DAILY WITH MEALS
Qty: 60 TABLET | Refills: 0 | Status: SHIPPED | OUTPATIENT
Start: 2019-03-12 | End: 2019-06-25 | Stop reason: ALTCHOICE

## 2019-03-12 NOTE — PROGRESS NOTES
HPI  Pt presents with left side low back pain-s/s started this am. No known injury or increase in activity over the weekend. Denies pain or burning with urination. Has increase in pain when twisting or sitting for too long.  Pain is different from her \"no • Cyst aspiration right     • Knee arthroscopy Right 1998    R knee arthroscopy   • Mammogram, both breasts  7/2008    abnormal mammogram, breast cyst   • Mammogram, both breasts  7/2006    abnormal mammogram, aspiration of breast cyst   • Punc/aspir luly Fear of current or ex partner: Not on file        Emotionally abused: Not on file        Physically abused: Not on file        Forced sexual activity: Not on file    Other Topics      Concerns:         Service: Not Asked        Blood Transfus Thoracic back: She exhibits decreased range of motion, tenderness and pain. She exhibits no bony tenderness and no spasm. Back:    Neurological: She is alert and oriented to person, place, and time. Skin: Skin is warm and dry.    Psychiatric:

## 2019-03-12 NOTE — TELEPHONE ENCOUNTER
Action Requested: Summary for Provider     []  Critical Lab, Recommendations Needed  [] Need Additional Advice  []   FYI    []   Need Orders  [] Need Medications Sent to Pharmacy  []  Other     SUMMARY: Spoke with patient who reports she is having lower le

## 2019-03-12 NOTE — ASSESSMENT & PLAN NOTE
Naproxen 500 mg I po bid prn with food  Ice to area 20 min 3-4 times per day  Flexeril 5 mg I po tid prn-will cause drowsiness    Please call if symptoms worsen or are not resolving.

## 2019-03-25 ENCOUNTER — TELEPHONE (OUTPATIENT)
Dept: PODIATRY CLINIC | Facility: CLINIC | Age: 54
End: 2019-03-25

## 2019-03-25 NOTE — TELEPHONE ENCOUNTER
Call back to [facility]. Let her be aware of the next step for steroid injection.  Pt made a follow up appointment

## 2019-03-25 NOTE — TELEPHONE ENCOUNTER
Pt states she did everything Dr told her to - still no relief - asking if she can get a cortisone shot in heel

## 2019-03-28 ENCOUNTER — OFFICE VISIT (OUTPATIENT)
Dept: PODIATRY CLINIC | Facility: CLINIC | Age: 54
End: 2019-03-28
Payer: COMMERCIAL

## 2019-03-28 ENCOUNTER — TELEPHONE (OUTPATIENT)
Dept: PODIATRY CLINIC | Facility: CLINIC | Age: 54
End: 2019-03-28

## 2019-03-28 VITALS — HEART RATE: 73 BPM | DIASTOLIC BLOOD PRESSURE: 84 MMHG | SYSTOLIC BLOOD PRESSURE: 132 MMHG | RESPIRATION RATE: 18 BRPM

## 2019-03-28 DIAGNOSIS — M72.2 PLANTAR FASCIITIS, BILATERAL: Primary | ICD-10-CM

## 2019-03-28 PROCEDURE — 99213 OFFICE O/P EST LOW 20 MIN: CPT | Performed by: PODIATRIST

## 2019-03-28 PROCEDURE — 20550 NJX 1 TENDON SHEATH/LIGAMENT: CPT | Performed by: PODIATRIST

## 2019-03-28 PROCEDURE — L3060 FOOT ARCH SUPP LONGITUD/META: HCPCS | Performed by: PODIATRIST

## 2019-03-28 NOTE — PROGRESS NOTES
Per verbal order from Dr. Katie Tony, draw up 0.5ml of 0.5% Marcaine and 20 mg of Depo-Medrol for cortisone injection to right heel.  Jojo Ruiz

## 2019-03-28 NOTE — PROGRESS NOTES
HPI:    Patient ID: Jessika Membreno is a 49year old female. 44-year-old female presents for follow-up care in reference to heel pain. The right heel continues to be a frustration she does not have a sense of any significant improvement.   She was taking pair of temporary insoles         ASSESSMENT/PLAN:   Plantar fasciitis, bilateral  (primary encounter diagnosis)    Orders Placed This Encounter      tendon sheath/ligament      Meds This Visit:  Requested Prescriptions      No prescriptions requested or o

## 2019-03-29 NOTE — TELEPHONE ENCOUNTER
Spoke to pt and informed her that, according to [facility] office notes from yesterday, she is to \"continue naproxen, support and icing\". Pt verbalized understanding.

## 2019-04-25 RX ORDER — LEVOTHYROXINE SODIUM 0.05 MG/1
TABLET ORAL
Qty: 90 TABLET | Refills: 1 | Status: SHIPPED | OUTPATIENT
Start: 2019-04-25 | End: 2019-12-19

## 2019-04-25 NOTE — TELEPHONE ENCOUNTER
LR 4-3-18 #30 + 11    Review pended refill request as it does not fall under a protocol.   Requested Prescriptions     Pending Prescriptions Disp Refills   • LEVOTHYROXINE SODIUM 50 MCG Oral Tab [Pharmacy Med Name: Levothyroxine Sodium 50 Mcg Tab Sand] 30

## 2019-06-10 ENCOUNTER — TELEPHONE (OUTPATIENT)
Dept: OTHER | Age: 54
End: 2019-06-10

## 2019-06-10 DIAGNOSIS — I10 HYPERTENSION, UNSPECIFIED TYPE: Primary | ICD-10-CM

## 2019-06-11 RX ORDER — LOSARTAN POTASSIUM 50 MG/1
TABLET ORAL
Qty: 30 TABLET | Refills: 0 | Status: SHIPPED | OUTPATIENT
Start: 2019-06-11 | End: 2019-06-25

## 2019-06-12 NOTE — TELEPHONE ENCOUNTER
Dr London Frank, please advise. Advised patient on Dr. Arabella Hancock information and recommendations. Patient verbalized understanding. Scheduled for 6/25/19 3:30 pm [facility]. Advised on need for fasting; she verbalized understanding. She asked if the doctor can give her a cortisone shot for her left elbow at the appointment 6/25/19? Lab orders created.     Vicente Webb DO  Em Rn Triage 15 hours ago (5:25 PM)      Last rx   Needs labs   cmp lipids cbc ua dx htn and o/v    Routing comment

## 2019-06-19 ENCOUNTER — LAB ENCOUNTER (OUTPATIENT)
Dept: LAB | Age: 54
End: 2019-06-19
Attending: FAMILY MEDICINE
Payer: COMMERCIAL

## 2019-06-19 DIAGNOSIS — I10 HYPERTENSION, UNSPECIFIED TYPE: ICD-10-CM

## 2019-06-19 DIAGNOSIS — E78.00 HIGH CHOLESTEROL: ICD-10-CM

## 2019-06-19 PROCEDURE — 80061 LIPID PANEL: CPT

## 2019-06-19 PROCEDURE — 85025 COMPLETE CBC W/AUTO DIFF WBC: CPT

## 2019-06-19 PROCEDURE — 81001 URINALYSIS AUTO W/SCOPE: CPT

## 2019-06-19 PROCEDURE — 80053 COMPREHEN METABOLIC PANEL: CPT

## 2019-06-19 PROCEDURE — 36415 COLL VENOUS BLD VENIPUNCTURE: CPT

## 2019-06-21 ENCOUNTER — TELEPHONE (OUTPATIENT)
Dept: FAMILY MEDICINE CLINIC | Facility: CLINIC | Age: 54
End: 2019-06-21

## 2019-06-21 NOTE — TELEPHONE ENCOUNTER
----- Message from Gus Chávez DO sent at 6/21/2019  1:33 PM CDT -----  Please send result letter/call. Have them follow up for an office visit. We'll discuss the results at our next visit. Cholesterol is high. White count was low.   Follow-up to

## 2019-06-25 ENCOUNTER — OFFICE VISIT (OUTPATIENT)
Dept: FAMILY MEDICINE CLINIC | Facility: CLINIC | Age: 54
End: 2019-06-25
Payer: COMMERCIAL

## 2019-06-25 VITALS
RESPIRATION RATE: 16 BRPM | TEMPERATURE: 99 F | SYSTOLIC BLOOD PRESSURE: 113 MMHG | HEIGHT: 59 IN | DIASTOLIC BLOOD PRESSURE: 74 MMHG | HEART RATE: 70 BPM | WEIGHT: 156 LBS | BODY MASS INDEX: 31.45 KG/M2

## 2019-06-25 DIAGNOSIS — M79.601 BILATERAL ARM PAIN: Primary | ICD-10-CM

## 2019-06-25 DIAGNOSIS — N30.00 ACUTE CYSTITIS WITHOUT HEMATURIA: ICD-10-CM

## 2019-06-25 DIAGNOSIS — I10 ESSENTIAL HYPERTENSION: ICD-10-CM

## 2019-06-25 DIAGNOSIS — M79.602 BILATERAL ARM PAIN: Primary | ICD-10-CM

## 2019-06-25 DIAGNOSIS — E78.00 HIGH CHOLESTEROL: ICD-10-CM

## 2019-06-25 PROCEDURE — 99212 OFFICE O/P EST SF 10 MIN: CPT | Performed by: FAMILY MEDICINE

## 2019-06-25 PROCEDURE — 99214 OFFICE O/P EST MOD 30 MIN: CPT | Performed by: FAMILY MEDICINE

## 2019-06-25 RX ORDER — SULFAMETHOXAZOLE AND TRIMETHOPRIM 800; 160 MG/1; MG/1
1 TABLET ORAL 2 TIMES DAILY
Qty: 20 TABLET | Refills: 0 | Status: SHIPPED | OUTPATIENT
Start: 2019-06-25 | End: 2019-07-29

## 2019-06-25 RX ORDER — ROSUVASTATIN CALCIUM 10 MG/1
10 TABLET, COATED ORAL NIGHTLY
Qty: 90 TABLET | Refills: 3 | Status: SHIPPED | OUTPATIENT
Start: 2019-06-25 | End: 2019-08-09

## 2019-06-25 RX ORDER — LOSARTAN POTASSIUM 50 MG/1
50 TABLET ORAL
Qty: 90 TABLET | Refills: 3 | Status: SHIPPED | OUTPATIENT
Start: 2019-06-25 | End: 2020-06-23

## 2019-06-25 NOTE — PROGRESS NOTES
Has tendonitis b/l kayla starte in left years ago  Then it goes away and comes back  Now has a little in rt elbow. Is right handed. Types all day.       Berkley was very high  Had issue in past with leg pain but turned out to be something else (radiculopat

## 2019-07-09 ENCOUNTER — TELEPHONE (OUTPATIENT)
Dept: OBGYN CLINIC | Facility: CLINIC | Age: 54
End: 2019-07-09

## 2019-07-09 ENCOUNTER — LAB ENCOUNTER (OUTPATIENT)
Dept: LAB | Age: 54
End: 2019-07-09
Attending: OBSTETRICS & GYNECOLOGY
Payer: COMMERCIAL

## 2019-07-09 DIAGNOSIS — R35.0 URINARY FREQUENCY: ICD-10-CM

## 2019-07-09 DIAGNOSIS — R35.0 URINARY FREQUENCY: Primary | ICD-10-CM

## 2019-07-09 PROCEDURE — 87086 URINE CULTURE/COLONY COUNT: CPT

## 2019-07-09 NOTE — TELEPHONE ENCOUNTER
Pt states she had a UA drawn with her pcp recently (6/19/19). PCP said results were \"cloudy\" and gave her bactrim. Pt states she finished the med but she is still having symptoms of pressure and feeling like she has to go to the bathroom all the time.

## 2019-07-09 NOTE — TELEPHONE ENCOUNTER
Pt informed of [facility]'s recs. Order placed. Pt informed that cultures do take longer to be resulted and we may not have results until Thursday or Friday. Pt informed we will call her once we have the results. Order placed.

## 2019-07-11 ENCOUNTER — TELEPHONE (OUTPATIENT)
Dept: OBGYN CLINIC | Facility: CLINIC | Age: 54
End: 2019-07-11

## 2019-07-11 NOTE — TELEPHONE ENCOUNTER
Pt informed of [facility]'s recs. PT states she knows something is wrong and wants to schedule an appt now. Appt scheduled on 7/18 in [place]. Pt states if the symptoms resolve before that, she will call and cancel.

## 2019-07-11 NOTE — TELEPHONE ENCOUNTER
Notes recorded by Mary Jane Peoples MD on 7/10/2019 at 7:25 PM CDT  Please call pt and inform her of results attached    Pt informed that urine culture showed no growth between 18-24 hours. Pt surprised by results.  Pt stated she still feels like she needs to

## 2019-07-11 NOTE — TELEPHONE ENCOUNTER
If Sx do not improve, consider pelvic exam to make sure no issues that could cause pressure feeling. Make sure not constipated.

## 2019-07-22 ENCOUNTER — TELEPHONE (OUTPATIENT)
Dept: PODIATRY CLINIC | Facility: CLINIC | Age: 54
End: 2019-07-22

## 2019-07-22 ENCOUNTER — OFFICE VISIT (OUTPATIENT)
Dept: PODIATRY CLINIC | Facility: CLINIC | Age: 54
End: 2019-07-22
Payer: COMMERCIAL

## 2019-07-22 ENCOUNTER — TELEPHONE (OUTPATIENT)
Dept: FAMILY MEDICINE CLINIC | Facility: CLINIC | Age: 54
End: 2019-07-22

## 2019-07-22 VITALS — RESPIRATION RATE: 20 BRPM | HEART RATE: 61 BPM | DIASTOLIC BLOOD PRESSURE: 82 MMHG | SYSTOLIC BLOOD PRESSURE: 123 MMHG

## 2019-07-22 DIAGNOSIS — M72.2 PLANTAR FASCIITIS, BILATERAL: Primary | ICD-10-CM

## 2019-07-22 PROCEDURE — 99213 OFFICE O/P EST LOW 20 MIN: CPT | Performed by: PODIATRIST

## 2019-07-22 RX ORDER — METHYLPREDNISOLONE 4 MG/1
TABLET ORAL
Qty: 1 PACKAGE | Refills: 0 | Status: SHIPPED | OUTPATIENT
Start: 2019-07-22 | End: 2019-07-29

## 2019-07-22 NOTE — TELEPHONE ENCOUNTER
Pt informed medication was sent to pharmacy. Pt states she cannot take steroid medication due to them making her sick. Pt asking if she can get a cortisone injection instead. Also asking if there is any other pain medication that she could take.

## 2019-07-22 NOTE — TELEPHONE ENCOUNTER
Pt was referred to dr Keyla Gee in [address] [place] [address] and she does not want to go there.  She wants dr Karlo Dave to give her the injection,she states he gave it to her before

## 2019-07-23 NOTE — PROGRESS NOTES
HPI:    Patient ID: Maryuri Pompa is a 49year old female. This 52-year-old female presents with recurrent heel pain.   The last time I saw this patient was approximately 4 months ago and at that time she was doing somewhat better after a cortisone inje her on a Medrol Dosepak and I reviewed the use of the medication, concerns, and expectations. She was instructed to return to the support and icing.   She called later in the day and stated that she cannot tolerate the oral cortisone medications as they ma

## 2019-07-23 NOTE — TELEPHONE ENCOUNTER
Spoke to pt and informed her of SCR message. Pt is already scheduled for 08/12/19 with SCR. Pt verbalized understanding.

## 2019-07-23 NOTE — TELEPHONE ENCOUNTER
Out of town for weeks  Better to go to ortho and get it taken care of.  alternatively to [facility].

## 2019-07-24 NOTE — TELEPHONE ENCOUNTER
Pt calling back and MD recommendations given. Pt would like to schedule with Dr Monet Ceja, Dr Monet Ceja contacted and ok to add pt to schedule next week. Pt was transferred to [facility] to schedule.

## 2019-07-29 ENCOUNTER — OFFICE VISIT (OUTPATIENT)
Dept: FAMILY MEDICINE CLINIC | Facility: CLINIC | Age: 54
End: 2019-07-29
Payer: COMMERCIAL

## 2019-07-29 VITALS
DIASTOLIC BLOOD PRESSURE: 88 MMHG | HEIGHT: 59 IN | HEART RATE: 61 BPM | WEIGHT: 158.38 LBS | BODY MASS INDEX: 31.93 KG/M2 | TEMPERATURE: 97 F | SYSTOLIC BLOOD PRESSURE: 128 MMHG

## 2019-07-29 DIAGNOSIS — M77.11 BILATERAL TENNIS ELBOW: Primary | ICD-10-CM

## 2019-07-29 DIAGNOSIS — M77.12 BILATERAL TENNIS ELBOW: Primary | ICD-10-CM

## 2019-07-29 DIAGNOSIS — M25.521 PAIN OF BOTH ELBOWS: ICD-10-CM

## 2019-07-29 DIAGNOSIS — M25.522 PAIN OF BOTH ELBOWS: ICD-10-CM

## 2019-07-29 PROCEDURE — 99214 OFFICE O/P EST MOD 30 MIN: CPT | Performed by: FAMILY MEDICINE

## 2019-07-29 RX ORDER — DICLOFENAC SODIUM 75 MG/1
75 TABLET, DELAYED RELEASE ORAL 2 TIMES DAILY
Qty: 60 TABLET | Refills: 0 | Status: SHIPPED | OUTPATIENT
Start: 2019-07-29 | End: 2019-08-19

## 2019-07-29 NOTE — PROGRESS NOTES
Patient ID: Lina Motnoya is a 49year old female. HPI  Patient presents with:  Elbow Pain: left elbow    States she has left elbow pain that has been present for a while but has been aggravated for the past couple months.  It is spreading to her rig arthralgias (bilateral elbow pain). Skin: Negative for color change. Neurological: Negative for speech difficulty. Psychiatric/Behavioral: The patient is not nervous/anxious.           Past Medical History:   Diagnosis Date   • Abnormal mammogram 2008 by mouth nightly. Disp: 90 tablet Rfl: 3   losartan Potassium 50 MG Oral Tab Take 1 tablet (50 mg total) by mouth once daily.  Disp: 90 tablet Rfl: 3   LEVOTHYROXINE SODIUM 50 MCG Oral Tab TAKE ONE TABLET BY MOUTH ONE TIME DAILY  Disp: 90 tablet Rfl: 1   Cl visit:    Bilateral tennis elbow  -     Diclofenac Sodium 75 MG Oral Tab EC; Take 1 tablet (75 mg total) by mouth 2 (two) times daily for 21 days. Take with meals. (for pain/inflammation). -     XR ELBOW, COMPLETE (MIN 3 VIEWS), LEFT (CPT=31790);  Future +++She has been having tennis elbow for quite some time. The left is worse than the right.   She would clearly benefit from ultrasound on the lateral epicondyle along with some stretching and strengthening exercises along with milking described in this documentation. All medical record entries made by the scribe were at my direction and in my presence.   I have reviewed the chart and discharge instructions (if applicable) and agree that the record reflects my personal performance and is

## 2019-07-29 NOTE — PATIENT INSTRUCTIONS
TENNIS ELBOW PLAN    Showed patient my tennis elbow brace and where to wear it on their forearm. Wear the tennis elbow brace 3 fingerbreadths down from the lateral epicondyle as shown at the visit.   Make sure that t

## 2019-08-07 ENCOUNTER — TELEPHONE (OUTPATIENT)
Dept: FAMILY MEDICINE CLINIC | Facility: CLINIC | Age: 54
End: 2019-08-07

## 2019-08-07 NOTE — TELEPHONE ENCOUNTER
FYI      Patient called,   Rosuvastatin  is giving her severe lag cramps,  they are fairly constant     States tried Rosuvastatin before and had the am e results with the leg cramps    Asking for a different cholesterol med , patient informed Aia 16 is OOT un

## 2019-08-09 RX ORDER — SIMVASTATIN 5 MG
5 TABLET ORAL NIGHTLY
Qty: 90 TABLET | Refills: 3 | Status: SHIPPED | OUTPATIENT
Start: 2019-08-09 | End: 2019-08-21 | Stop reason: SINTOL

## 2019-08-12 ENCOUNTER — OFFICE VISIT (OUTPATIENT)
Dept: PODIATRY CLINIC | Facility: CLINIC | Age: 54
End: 2019-08-12
Payer: COMMERCIAL

## 2019-08-12 ENCOUNTER — TELEPHONE (OUTPATIENT)
Dept: FAMILY MEDICINE CLINIC | Facility: CLINIC | Age: 54
End: 2019-08-12

## 2019-08-12 VITALS — HEART RATE: 66 BPM | RESPIRATION RATE: 18 BRPM | SYSTOLIC BLOOD PRESSURE: 128 MMHG | DIASTOLIC BLOOD PRESSURE: 82 MMHG

## 2019-08-12 DIAGNOSIS — M72.2 PLANTAR FASCIITIS, BILATERAL: Primary | ICD-10-CM

## 2019-08-12 PROCEDURE — 20550 NJX 1 TENDON SHEATH/LIGAMENT: CPT | Performed by: PODIATRIST

## 2019-08-12 RX ORDER — METHYLPREDNISOLONE ACETATE 80 MG/ML
20 INJECTION, SUSPENSION INTRA-ARTICULAR; INTRALESIONAL; INTRAMUSCULAR; SOFT TISSUE ONCE
Status: COMPLETED | OUTPATIENT
Start: 2019-08-12 | End: 2019-08-12

## 2019-08-12 RX ADMIN — METHYLPREDNISOLONE ACETATE 20 MG: 80 INJECTION, SUSPENSION INTRA-ARTICULAR; INTRALESIONAL; INTRAMUSCULAR; SOFT TISSUE at 16:09:00

## 2019-08-12 NOTE — PROGRESS NOTES
Per verbal order from Dr. Liane Sharma, draw up 0.5ml of 0.5% Marcaine and 20 mg of Depo-Medrol for cortisone injection to right heel.  Jojo Ruiz

## 2019-08-12 NOTE — PROGRESS NOTES
HPI:    Patient ID: Miroslava Linda is a 49year old female. This 73-year-old female presents for follow-up in reference to right heel pain. Although she has had some positive improvement she continues to have daily symptoms.       ROS:     I did review

## 2019-08-21 ENCOUNTER — OFFICE VISIT (OUTPATIENT)
Dept: FAMILY MEDICINE CLINIC | Facility: CLINIC | Age: 54
End: 2019-08-21
Payer: COMMERCIAL

## 2019-08-21 ENCOUNTER — NURSE TRIAGE (OUTPATIENT)
Dept: OTHER | Age: 54
End: 2019-08-21

## 2019-08-21 ENCOUNTER — HOSPITAL ENCOUNTER (OUTPATIENT)
Dept: ULTRASOUND IMAGING | Facility: HOSPITAL | Age: 54
Discharge: HOME | End: 2019-08-21
Attending: NURSE PRACTITIONER
Payer: COMMERCIAL

## 2019-08-21 VITALS
WEIGHT: 158 LBS | BODY MASS INDEX: 31.85 KG/M2 | HEART RATE: 67 BPM | SYSTOLIC BLOOD PRESSURE: 147 MMHG | HEIGHT: 59 IN | DIASTOLIC BLOOD PRESSURE: 85 MMHG

## 2019-08-21 DIAGNOSIS — M79.662 PAIN OF LEFT CALF: ICD-10-CM

## 2019-08-21 DIAGNOSIS — M79.662 PAIN OF LEFT LOWER LEG: ICD-10-CM

## 2019-08-21 DIAGNOSIS — M79.662 PAIN OF LEFT CALF: Primary | ICD-10-CM

## 2019-08-21 PROCEDURE — 93971 EXTREMITY STUDY: CPT | Performed by: NURSE PRACTITIONER

## 2019-08-21 PROCEDURE — 99214 OFFICE O/P EST MOD 30 MIN: CPT | Performed by: NURSE PRACTITIONER

## 2019-08-21 NOTE — PROGRESS NOTES
HPI  Pt presents for left calf pain since restarting simvastatin 2 weeks ago. Yesterday developed worsening pain catie during speedwalking. Pain con't today and worse w activity. Denies erythema or swelling to back of leg.  She also is having pain to the top •      • Colonoscopy     • Cyst aspiration right     • Knee arthroscopy Right     R knee arthroscopy   • Mammogram, both breasts  2008    abnormal mammogram, breast cyst   • Mammogram, both breasts  2006    abnormal mammogram, aspi Intimate partner violence:        Fear of current or ex partner: Not on file        Emotionally abused: Not on file        Physically abused: Not on file        Forced sexual activity: Not on file    Other Topics      Concerns:         Service: F/u in 2 weeks regarding muscle aches-call sooner if not resolved.             Other Visit Diagnoses     Pain of left calf    -  Primary    Relevant Orders    US VENOUS DOPPLER LEG LEFT - DIAG IMG (VNL=19133) (Completed)                      Discussed plan

## 2019-08-21 NOTE — ASSESSMENT & PLAN NOTE
Stat u/s left lower leg  Stop simvastatin  F/u in 2 weeks regarding muscle aches-call sooner if not resolved.

## 2019-08-21 NOTE — TELEPHONE ENCOUNTER
Action Requested: Summary for Provider     []  Critical Lab, Recommendations Needed  [x] Need Additional Advice  []   FYI    []   Need Orders  [] Need Medications Sent to Pharmacy  []  Other     SUMMARY: Patient stated that has had ongoing calf pain with c

## 2019-08-21 NOTE — TELEPHONE ENCOUNTER
Detailed vm left for pt with MD recommendations and request to call back. My chart message also sent to pt.

## 2019-08-21 NOTE — TELEPHONE ENCOUNTER
Stop statin medication patient needs eval for calf pain today either in office or ER to rule out potentially fatal blood clot.

## 2019-08-21 NOTE — TELEPHONE ENCOUNTER
Informed pt Dr. Elsa Cavazos instructions. Offered several  appt. Pt declined. Wanted to go to [place] pt declined appts offered at [address] [place] [address].  Pt states she will call back

## 2019-09-26 ENCOUNTER — OFFICE VISIT (OUTPATIENT)
Dept: OBGYN CLINIC | Facility: CLINIC | Age: 54
End: 2019-09-26
Payer: COMMERCIAL

## 2019-09-26 VITALS
BODY MASS INDEX: 33.58 KG/M2 | WEIGHT: 160 LBS | HEIGHT: 58 IN | DIASTOLIC BLOOD PRESSURE: 76 MMHG | SYSTOLIC BLOOD PRESSURE: 122 MMHG | HEART RATE: 66 BPM

## 2019-09-26 DIAGNOSIS — Z01.419 WOMEN'S ANNUAL ROUTINE GYNECOLOGICAL EXAMINATION: ICD-10-CM

## 2019-09-26 DIAGNOSIS — Z12.31 VISIT FOR SCREENING MAMMOGRAM: Primary | ICD-10-CM

## 2019-09-26 PROCEDURE — 99396 PREV VISIT EST AGE 40-64: CPT | Performed by: OBSTETRICS & GYNECOLOGY

## 2019-09-26 NOTE — PROGRESS NOTES
Jessika Membreno is a 49year old female  No LMP recorded. (Menstrual status: Menopause). Patient presents with:  Gyn Exam: ANNUAL EXAM -- no change in hx / FHx  .     OBSTETRICS HISTORY:  OB History    Para Term  AB Living   2 2 2 COLONOSCOPY  2010   • CYST ASPIRATION RIGHT     • KNEE ARTHROSCOPY Right 1998    R knee arthroscopy   • MAMMOGRAM, BOTH BREASTS  7/2008    abnormal mammogram, breast cyst   • MAMMOGRAM, BOTH BREASTS  7/2006    abnormal mammogram, aspiration of breast cyst file        Forced sexual activity: Not on file    Other Topics      Concerns:         Service: Not Asked        Blood Transfusions: Not Asked        Caffeine Concern: Yes          coffee, 2 cups daily        Occupational Exposure: Not Asked bleeding      PHYSICAL EXAM:   Blood pressure 122/76, pulse 66, height 4' 10\" (1.473 m), weight 160 lb (72.6 kg), unknown if currently breastfeeding.   Constitutional:  well developed, well nourished  Head/Face:  normocephalic  Neck/Thyroid: thyroid symmet

## 2019-09-27 LAB — HPV I/H RISK 1 DNA SPEC QL NAA+PROBE: NEGATIVE

## 2019-10-07 ENCOUNTER — TELEPHONE (OUTPATIENT)
Dept: FAMILY MEDICINE CLINIC | Facility: CLINIC | Age: 54
End: 2019-10-07

## 2019-10-07 DIAGNOSIS — E78.00 HIGH CHOLESTEROL: Primary | ICD-10-CM

## 2019-10-07 DIAGNOSIS — E03.9 HYPOTHYROIDISM, UNSPECIFIED TYPE: Primary | ICD-10-CM

## 2019-10-07 RX ORDER — EZETIMIBE 10 MG/1
10 TABLET ORAL DAILY
Qty: 90 TABLET | Refills: 3 | Status: SHIPPED | OUTPATIENT
Start: 2019-10-07 | End: 2020-06-23

## 2019-10-07 NOTE — TELEPHONE ENCOUNTER
Patient informed [facility] reviewed results and states \"choleterol and thyroid off. Needs o/v \"  Pt states has already seen MAHAD Del Castillo in regards to cholesterol issue. States was just in the office and is not coming back in.     Lab results will be put in scan

## 2019-10-07 NOTE — TELEPHONE ENCOUNTER
Simvastatin was discontinued 8/21/19 Dave Calderón. Did you want to prescribe an alternated medication for this patient?

## 2019-10-07 NOTE — TELEPHONE ENCOUNTER
Patient states she asked Claudia Tejeda for another medication to lower her cholesterol that is not a statin and Claudia Tejeda told patient yes there is. I informed patient I am not aware of any such drug.     Patient states all the statins she been on have g

## 2019-10-07 NOTE — TELEPHONE ENCOUNTER
Duane Musca stated that she faxed over [facility] (Blood test) results. Please call her back once you received the fax. Thank you.

## 2019-10-07 NOTE — TELEPHONE ENCOUNTER
Neomia Mass was advised from another Dr to stop taking the cholesterol medication and there's an another alternative besides the -statins. Patient does not know the medication names. Please advise and call patient back. Thank you.

## 2019-10-08 NOTE — TELEPHONE ENCOUNTER
Called patient left voice message that new medication Zetia was sent to her pharmacy and to call back with any questions or concerns.

## 2019-10-16 NOTE — TELEPHONE ENCOUNTER
Labs reviewed-cholesterol levels are elevated as is thyroid levels. Pt will need to make an appt to discuss as the last time she was in was in August and was seen for calf pain.  She can make appt with myself or Dr Scarlett Espino

## 2019-10-17 NOTE — TELEPHONE ENCOUNTER
Patient notified of MD's recommendation. Patient verbalized understanding. Order added per DR [facility].

## 2019-10-17 NOTE — TELEPHONE ENCOUNTER
Dr Keesha Boyd, patient declined appt to review labs, stating she just started zetia 10/7/19 and she has frequently missed taking her levothyroxine.  She will start taking it daily and would like to know when you want her to retest?

## 2019-10-23 ENCOUNTER — HOSPITAL ENCOUNTER (OUTPATIENT)
Dept: MAMMOGRAPHY | Age: 54
Discharge: HOME | End: 2019-10-23
Attending: OBSTETRICS & GYNECOLOGY
Payer: COMMERCIAL

## 2019-10-23 DIAGNOSIS — Z12.31 VISIT FOR SCREENING MAMMOGRAM: ICD-10-CM

## 2019-10-23 PROCEDURE — 77063 BREAST TOMOSYNTHESIS BI: CPT | Performed by: OBSTETRICS & GYNECOLOGY

## 2019-10-23 PROCEDURE — 77067 SCR MAMMO BI INCL CAD: CPT | Performed by: OBSTETRICS & GYNECOLOGY

## 2019-12-19 ENCOUNTER — TELEPHONE (OUTPATIENT)
Dept: OBGYN CLINIC | Facility: CLINIC | Age: 54
End: 2019-12-19

## 2019-12-19 ENCOUNTER — LAB ENCOUNTER (OUTPATIENT)
Dept: LAB | Age: 54
End: 2019-12-19
Attending: OBSTETRICS & GYNECOLOGY
Payer: COMMERCIAL

## 2019-12-19 ENCOUNTER — TELEPHONE (OUTPATIENT)
Dept: OTHER | Age: 54
End: 2019-12-19

## 2019-12-19 DIAGNOSIS — R30.9 PAINFUL URINATION: ICD-10-CM

## 2019-12-19 DIAGNOSIS — R35.0 URINARY FREQUENCY: ICD-10-CM

## 2019-12-19 DIAGNOSIS — E03.9 HYPOTHYROIDISM, UNSPECIFIED TYPE: ICD-10-CM

## 2019-12-19 DIAGNOSIS — R30.9 PAINFUL URINATION: Primary | ICD-10-CM

## 2019-12-19 DIAGNOSIS — R39.15 URINARY URGENCY: ICD-10-CM

## 2019-12-19 DIAGNOSIS — E78.00 HIGH CHOLESTEROL: ICD-10-CM

## 2019-12-19 DIAGNOSIS — R31.0 GROSS HEMATURIA: ICD-10-CM

## 2019-12-19 PROCEDURE — 80061 LIPID PANEL: CPT

## 2019-12-19 PROCEDURE — 84439 ASSAY OF FREE THYROXINE: CPT

## 2019-12-19 PROCEDURE — 84443 ASSAY THYROID STIM HORMONE: CPT

## 2019-12-19 PROCEDURE — 87086 URINE CULTURE/COLONY COUNT: CPT

## 2019-12-19 PROCEDURE — 36415 COLL VENOUS BLD VENIPUNCTURE: CPT

## 2019-12-19 PROCEDURE — 80053 COMPREHEN METABOLIC PANEL: CPT

## 2019-12-19 PROCEDURE — 81001 URINALYSIS AUTO W/SCOPE: CPT

## 2019-12-19 RX ORDER — LEVOTHYROXINE SODIUM 0.05 MG/1
50 TABLET ORAL
Qty: 90 TABLET | Refills: 1 | Status: CANCELLED | OUTPATIENT
Start: 2019-12-19

## 2019-12-19 RX ORDER — SULFAMETHOXAZOLE AND TRIMETHOPRIM 800; 160 MG/1; MG/1
1 TABLET ORAL 2 TIMES DAILY
Qty: 6 TABLET | Refills: 0 | Status: SHIPPED | OUTPATIENT
Start: 2019-12-19 | End: 2019-12-22

## 2019-12-19 RX ORDER — LEVOTHYROXINE SODIUM 0.05 MG/1
50 TABLET ORAL
Qty: 90 TABLET | Refills: 1 | Status: SHIPPED | OUTPATIENT
Start: 2019-12-19 | End: 2020-06-23

## 2019-12-19 NOTE — TELEPHONE ENCOUNTER
C/O PAIN WITH URINATION, URGENCY, SMALL AMOUNTS AND SEES BLOOD IN THE URINE. PT WILL COME FOR UA NOW. ALLERGIES AND PHARMACY CONFIRMED.

## 2019-12-19 NOTE — TELEPHONE ENCOUNTER
Please review and advise UA. Urine Cx pending. Message to [address]. Pt is allergic to Simvastatin. Pt c/o of pain with urination, urgency, and small amounts of blood in urine.

## 2019-12-19 NOTE — TELEPHONE ENCOUNTER
Patient called stating she has 1-2 tabs left of her Levothyroxine 50 mg. Patient completed TSH blood test today. Please advise on most recent thyroid blood and dosage of medication. Verified that patient wants medication sent to [facility].

## 2019-12-19 NOTE — TELEPHONE ENCOUNTER
Pt informed it is because she has a uti. Pt also informed that once we have the culture results, we may switch the med. Pt expressed understanding.

## 2019-12-21 NOTE — TELEPHONE ENCOUNTER
Urine culture results---\"no growth 2 days\". Message to [facility] as [id number] [address] [place].

## 2019-12-24 NOTE — TELEPHONE ENCOUNTER
Informed pt of results and [facility] recs below. Pt denies UTI symptoms. Pt verbalized understanding.    Notes recorded by Heriberto Sanches MD on 12/23/2019 at 11:21 AM CST  Inform pt final result with no growth -- if still w/ UTI that made us check u/a, repeat mi

## 2020-06-05 ENCOUNTER — TELEPHONE (OUTPATIENT)
Dept: OTHER | Age: 55
End: 2020-06-05

## 2020-06-05 NOTE — TELEPHONE ENCOUNTER
Patient was sent home from work today because coworker which she had contact with 5 days ago tested positive for COVID today. Patient not having any symptoms. Advised patient that needs to self quarantine for 14 days.  If develops any symptoms to give us a

## 2020-06-17 ENCOUNTER — NURSE TRIAGE (OUTPATIENT)
Dept: FAMILY MEDICINE CLINIC | Facility: CLINIC | Age: 55
End: 2020-06-17

## 2020-06-17 NOTE — TELEPHONE ENCOUNTER
Action Requested: Summary for Provider     []  Critical Lab, Recommendations Needed  [x] Need Additional Advice  [x]   FYI    []   Need Orders  [] Need Medications Sent to Pharmacy  []  Other     SUMMARY: Patient states since last week she has been having

## 2020-06-18 ENCOUNTER — OFFICE VISIT (OUTPATIENT)
Dept: FAMILY MEDICINE CLINIC | Facility: CLINIC | Age: 55
End: 2020-06-18
Payer: COMMERCIAL

## 2020-06-18 VITALS
SYSTOLIC BLOOD PRESSURE: 125 MMHG | DIASTOLIC BLOOD PRESSURE: 81 MMHG | HEART RATE: 74 BPM | WEIGHT: 160 LBS | HEIGHT: 59 IN | TEMPERATURE: 97 F | BODY MASS INDEX: 32.25 KG/M2

## 2020-06-18 DIAGNOSIS — M25.562 ACUTE PAIN OF BOTH KNEES: ICD-10-CM

## 2020-06-18 DIAGNOSIS — R60.0 BILATERAL LEG EDEMA: Primary | ICD-10-CM

## 2020-06-18 DIAGNOSIS — M79.671 FOOT PAIN, BILATERAL: ICD-10-CM

## 2020-06-18 DIAGNOSIS — M79.672 FOOT PAIN, BILATERAL: ICD-10-CM

## 2020-06-18 DIAGNOSIS — M25.561 ACUTE PAIN OF BOTH KNEES: ICD-10-CM

## 2020-06-18 PROCEDURE — 99214 OFFICE O/P EST MOD 30 MIN: CPT | Performed by: STUDENT IN AN ORGANIZED HEALTH CARE EDUCATION/TRAINING PROGRAM

## 2020-06-18 NOTE — PROGRESS NOTES
HPI:    Patient ID: Beth Forman is a 48year old female. HPI  Pt presenting with leg swelling. Pt reports b/l leg swelling for the last week, associated with foot pain and achy knees. Described as \"pregnancy legs\".  Notes increased water intake an Weight: 160 lb (72.6 kg)   Height: 4' 11\" (1.499 m)       Body mass index is 32.32 kg/m². PHYSICAL EXAM:   Physical Exam  Vitals signs reviewed. Constitutional:       General: She is not in acute distress. Appearance: Normal appearance.  She is w Neurological:      General: No focal deficit present. Mental Status: She is alert and oriented to person, place, and time. Mental status is at baseline.    Psychiatric:         Attention and Perception: Attention and perception normal.         Mood a

## 2020-06-18 NOTE — TELEPHONE ENCOUNTER
Spoke with patient, (  verified ) she did not go to UC , states swelling has decreased in all areas    Offered appt, declines at this time, states  will monitor swelling and call back if needed   Advised to call back with any questions/ concerns     Nano Krishnan

## 2020-06-19 ENCOUNTER — APPOINTMENT (OUTPATIENT)
Dept: LAB | Age: 55
End: 2020-06-19
Attending: STUDENT IN AN ORGANIZED HEALTH CARE EDUCATION/TRAINING PROGRAM
Payer: COMMERCIAL

## 2020-06-19 DIAGNOSIS — M25.562 ACUTE PAIN OF BOTH KNEES: ICD-10-CM

## 2020-06-19 DIAGNOSIS — M79.89 SYMPTOM OF LEG SWELLING: ICD-10-CM

## 2020-06-19 DIAGNOSIS — D64.9 ANEMIA, UNSPECIFIED TYPE: Primary | ICD-10-CM

## 2020-06-19 DIAGNOSIS — M25.561 ACUTE PAIN OF BOTH KNEES: ICD-10-CM

## 2020-06-19 DIAGNOSIS — D64.9 ANEMIA, UNSPECIFIED TYPE: ICD-10-CM

## 2020-06-19 DIAGNOSIS — R60.0 BILATERAL LEG EDEMA: ICD-10-CM

## 2020-06-19 DIAGNOSIS — M79.672 FOOT PAIN, BILATERAL: ICD-10-CM

## 2020-06-19 DIAGNOSIS — M79.671 FOOT PAIN, BILATERAL: ICD-10-CM

## 2020-06-19 PROCEDURE — 36415 COLL VENOUS BLD VENIPUNCTURE: CPT

## 2020-06-19 PROCEDURE — 80048 BASIC METABOLIC PNL TOTAL CA: CPT

## 2020-06-19 PROCEDURE — 83880 ASSAY OF NATRIURETIC PEPTIDE: CPT | Performed by: STUDENT IN AN ORGANIZED HEALTH CARE EDUCATION/TRAINING PROGRAM

## 2020-06-19 PROCEDURE — 85652 RBC SED RATE AUTOMATED: CPT

## 2020-06-19 PROCEDURE — 82728 ASSAY OF FERRITIN: CPT

## 2020-06-19 PROCEDURE — 83036 HEMOGLOBIN GLYCOSYLATED A1C: CPT

## 2020-06-19 PROCEDURE — 84466 ASSAY OF TRANSFERRIN: CPT

## 2020-06-19 PROCEDURE — 83540 ASSAY OF IRON: CPT

## 2020-06-19 PROCEDURE — 85027 COMPLETE CBC AUTOMATED: CPT

## 2020-06-19 PROCEDURE — 84443 ASSAY THYROID STIM HORMONE: CPT

## 2020-06-23 ENCOUNTER — TELEPHONE (OUTPATIENT)
Dept: FAMILY MEDICINE CLINIC | Facility: CLINIC | Age: 55
End: 2020-06-23

## 2020-06-23 DIAGNOSIS — I10 ESSENTIAL HYPERTENSION: Primary | ICD-10-CM

## 2020-06-23 DIAGNOSIS — E03.9 ACQUIRED HYPOTHYROIDISM: ICD-10-CM

## 2020-06-23 DIAGNOSIS — E78.49 OTHER HYPERLIPIDEMIA: ICD-10-CM

## 2020-06-23 RX ORDER — LOSARTAN POTASSIUM 50 MG/1
50 TABLET ORAL
Qty: 90 TABLET | Refills: 3 | Status: SHIPPED | OUTPATIENT
Start: 2020-06-23 | End: 2021-06-21

## 2020-06-23 RX ORDER — LEVOTHYROXINE SODIUM 0.05 MG/1
50 TABLET ORAL
Qty: 90 TABLET | Refills: 1 | Status: SHIPPED | OUTPATIENT
Start: 2020-06-23 | End: 2020-12-28

## 2020-06-23 RX ORDER — EZETIMIBE 10 MG/1
10 TABLET ORAL DAILY
Qty: 90 TABLET | Refills: 3 | Status: SHIPPED | OUTPATIENT
Start: 2020-06-23 | End: 2021-06-18

## 2020-06-23 NOTE — TELEPHONE ENCOUNTER
Patient is requesting refills, needs them stat because she is going out of town soon and will be gone 2 week.       Levothyroxine Sodium 50 MCG Oral Tab     ezetimibe (ZETIA) 10 MG Oral Tab     losartan Potassium 50 MG Oral Tab

## 2020-09-23 ENCOUNTER — TELEPHONE (OUTPATIENT)
Dept: FAMILY MEDICINE CLINIC | Facility: CLINIC | Age: 55
End: 2020-09-23

## 2020-09-23 NOTE — TELEPHONE ENCOUNTER
Patient had her blood work results faxed to us this morning, used to see Dr. Teo Altamirano, is now established with Dr. Fish Gomez, please make sure the results are attn to Joel Carey.

## 2020-09-24 NOTE — TELEPHONE ENCOUNTER
Called Pt to inform that dr Karrie Alcaraz have not received a fax with previous lab results. Pt will follow up with Dr Shameka Zavala.

## 2020-10-09 ENCOUNTER — NURSE TRIAGE (OUTPATIENT)
Dept: FAMILY MEDICINE CLINIC | Facility: CLINIC | Age: 55
End: 2020-10-09

## 2020-10-09 ENCOUNTER — TELEMEDICINE (OUTPATIENT)
Dept: FAMILY MEDICINE CLINIC | Facility: CLINIC | Age: 55
End: 2020-10-09
Payer: COMMERCIAL

## 2020-10-09 DIAGNOSIS — R05.8 POST-VIRAL COUGH SYNDROME: Primary | ICD-10-CM

## 2020-10-09 DIAGNOSIS — R06.2 WHEEZING: ICD-10-CM

## 2020-10-09 PROCEDURE — 99214 OFFICE O/P EST MOD 30 MIN: CPT | Performed by: FAMILY MEDICINE

## 2020-10-09 RX ORDER — ALBUTEROL SULFATE 90 UG/1
1 AEROSOL, METERED RESPIRATORY (INHALATION) 3 TIMES DAILY PRN
Qty: 1 INHALER | Refills: 0 | Status: SHIPPED | OUTPATIENT
Start: 2020-10-09 | End: 2021-11-30

## 2020-10-09 RX ORDER — IPRATROPIUM BROMIDE 17 UG/1
2 AEROSOL, METERED RESPIRATORY (INHALATION) 3 TIMES DAILY PRN
Qty: 1 INHALER | Refills: 0 | Status: SHIPPED | OUTPATIENT
Start: 2020-10-09 | End: 2021-11-30

## 2020-10-09 NOTE — PROGRESS NOTES
Virtual Telephone Check-In    Yuridia Bocanegra verbally consents to a Virtual/Telephone Check-In service on 10/09/20. Patient understands and accepts financial responsibility for any deductible, co-insurance and/or co-pays associated with this service. (three) times daily as needed for Wheezing or Shortness of Breath. Dispense: 1 Inhaler; Refill: 0    Advised to call back clinic if no improvement in symptoms. Red flags discussed to go to [facility].      Reji Hamlin MD

## 2020-10-09 NOTE — TELEPHONE ENCOUNTER
Action Requested: Summary for Provider     []  Critical Lab, Recommendations Needed  [] Need Additional Advice  []   FYI    []   Need Orders  [] Need Medications Sent to Pharmacy  []  Other     SUMMARY: Patient calling with cough that has been ongoing for

## 2020-10-13 ENCOUNTER — OFFICE VISIT (OUTPATIENT)
Dept: FAMILY MEDICINE CLINIC | Facility: CLINIC | Age: 55
End: 2020-10-13
Payer: COMMERCIAL

## 2020-10-13 VITALS
SYSTOLIC BLOOD PRESSURE: 118 MMHG | BODY MASS INDEX: 31.45 KG/M2 | WEIGHT: 156 LBS | TEMPERATURE: 99 F | HEART RATE: 70 BPM | OXYGEN SATURATION: 99 % | HEIGHT: 59 IN | DIASTOLIC BLOOD PRESSURE: 71 MMHG

## 2020-10-13 DIAGNOSIS — Z00.00 ANNUAL PHYSICAL EXAM: Primary | ICD-10-CM

## 2020-10-13 DIAGNOSIS — E87.5 HYPERKALEMIA: ICD-10-CM

## 2020-10-13 DIAGNOSIS — R05.8 POST-VIRAL COUGH SYNDROME: ICD-10-CM

## 2020-10-13 PROCEDURE — 3008F BODY MASS INDEX DOCD: CPT | Performed by: FAMILY MEDICINE

## 2020-10-13 PROCEDURE — 3074F SYST BP LT 130 MM HG: CPT | Performed by: FAMILY MEDICINE

## 2020-10-13 PROCEDURE — 99396 PREV VISIT EST AGE 40-64: CPT | Performed by: FAMILY MEDICINE

## 2020-10-13 PROCEDURE — 3078F DIAST BP <80 MM HG: CPT | Performed by: FAMILY MEDICINE

## 2020-10-13 NOTE — PROGRESS NOTES
HPI:   Veda Garduno is a 48year old female who presents for a complete physical exam.    Work: Works in an office.    Social: Lives with family,  and kids grown  Diet: eats home cooked meals, salads daily, trying to cut carbs  Exercise: active, Breast cyst 7/2008 2008 abnormal mammogram, cyst aspiration via u/s   • Breast cyst 7/2006    abnormal mammogram, aspiration of breast cyst   • Depression 2006    drug therapy   • Esophagitis    • Gastritis     2010 EGD with biopsy   • Irregular menstru otherwise  SKIN: denies any skin lesions  HEENT: denies nasal congestion  LUNGS: denies shortness of breath with exertion  CARDIOVASCULAR: denies chest pain on exertion  GI: denies abdominal pain,denies heartburn  : denies dysuria,  MUSCULOSKELETAL:  Feels that it is helping and her cough is getting better. RTC if no improvement in symptoms. Red flags discussed to go to ER.      Claudene Loop, MD  10/13/2020  1:09 PM

## 2020-10-22 ENCOUNTER — TELEPHONE (OUTPATIENT)
Dept: FAMILY MEDICINE CLINIC | Facility: CLINIC | Age: 55
End: 2020-10-22

## 2020-10-22 DIAGNOSIS — R05.8 POST-VIRAL COUGH SYNDROME: Primary | ICD-10-CM

## 2020-10-22 RX ORDER — PREDNISONE 20 MG/1
40 TABLET ORAL DAILY
Qty: 10 TABLET | Refills: 0 | Status: SHIPPED | OUTPATIENT
Start: 2020-10-22 | End: 2020-10-27

## 2020-10-22 NOTE — TELEPHONE ENCOUNTER
Please let patient know that I sent prednisone to the pharmacy for her to start taking to help calm down irritated airways and hopefully help with the cough.   If her shortness of breath does not improve she needs to go to the emergency room for cxr and gamaliel

## 2020-10-22 NOTE — TELEPHONE ENCOUNTER
Pt reports about 4 weeks ago was diagnosed with bronchitis. Reports was seen on 10/13/20 for physical and discussed symptoms with Dr Blake Lnae.  States symptoms are not improving since discussing with Dr Blake Lane; states the past 2 days has also had mild intermitt

## 2020-10-22 NOTE — TELEPHONE ENCOUNTER
Left message to pt to call back.        Future Appointments   Date Time Provider Hyun Kimbrough   11/10/2020 11:40 AM [facility]O [facility] RM[facility]

## 2020-11-10 ENCOUNTER — HOSPITAL ENCOUNTER (OUTPATIENT)
Dept: MAMMOGRAPHY | Age: 55
Discharge: HOME | End: 2020-11-10
Attending: FAMILY MEDICINE
Payer: COMMERCIAL

## 2020-11-10 DIAGNOSIS — Z00.00 ANNUAL PHYSICAL EXAM: ICD-10-CM

## 2020-11-10 PROCEDURE — 77067 SCR MAMMO BI INCL CAD: CPT | Performed by: FAMILY MEDICINE

## 2020-11-10 PROCEDURE — 77063 BREAST TOMOSYNTHESIS BI: CPT | Performed by: FAMILY MEDICINE

## 2020-11-27 ENCOUNTER — MED REC SCAN ONLY (OUTPATIENT)
Dept: FAMILY MEDICINE CLINIC | Facility: CLINIC | Age: 55
End: 2020-11-27

## 2020-12-28 DIAGNOSIS — E03.9 ACQUIRED HYPOTHYROIDISM: ICD-10-CM

## 2020-12-28 RX ORDER — LEVOTHYROXINE SODIUM 0.05 MG/1
TABLET ORAL
Qty: 90 TABLET | Refills: 0 | Status: SHIPPED | OUTPATIENT
Start: 2020-12-28 | End: 2021-03-29

## 2021-03-29 DIAGNOSIS — E03.9 ACQUIRED HYPOTHYROIDISM: ICD-10-CM

## 2021-03-29 RX ORDER — LEVOTHYROXINE SODIUM 0.05 MG/1
TABLET ORAL
Qty: 90 TABLET | Refills: 0 | Status: SHIPPED | OUTPATIENT
Start: 2021-03-29 | End: 2021-07-22

## 2021-04-09 DIAGNOSIS — Z23 NEED FOR VACCINATION: ICD-10-CM

## 2021-04-20 ENCOUNTER — NURSE TRIAGE (OUTPATIENT)
Dept: FAMILY MEDICINE CLINIC | Facility: CLINIC | Age: 56
End: 2021-04-20

## 2021-04-20 NOTE — TELEPHONE ENCOUNTER
Action Requested: Summary for Provider     []  Critical Lab, Recommendations Needed  [] Need Additional Advice  [x]   FYI    []   Need Orders  [] Need Medications Sent to Pharmacy  []  Other     SUMMARY: Patient states for past several months she has been

## 2021-04-22 ENCOUNTER — HOSPITAL ENCOUNTER (OUTPATIENT)
Dept: GENERAL RADIOLOGY | Age: 56
Discharge: HOME | End: 2021-04-22
Attending: FAMILY MEDICINE
Payer: COMMERCIAL

## 2021-04-22 ENCOUNTER — OFFICE VISIT (OUTPATIENT)
Dept: FAMILY MEDICINE CLINIC | Facility: CLINIC | Age: 56
End: 2021-04-22
Payer: COMMERCIAL

## 2021-04-22 VITALS
DIASTOLIC BLOOD PRESSURE: 85 MMHG | SYSTOLIC BLOOD PRESSURE: 127 MMHG | WEIGHT: 151.63 LBS | TEMPERATURE: 97 F | BODY MASS INDEX: 30.57 KG/M2 | HEIGHT: 59 IN | HEART RATE: 74 BPM

## 2021-04-22 DIAGNOSIS — M54.2 NECK PAIN: ICD-10-CM

## 2021-04-22 DIAGNOSIS — M62.838 NECK MUSCLE SPASM: ICD-10-CM

## 2021-04-22 DIAGNOSIS — M54.2 NECK PAIN: Primary | ICD-10-CM

## 2021-04-22 DIAGNOSIS — M62.838 TRAPEZIUS MUSCLE SPASM: ICD-10-CM

## 2021-04-22 PROCEDURE — 99214 OFFICE O/P EST MOD 30 MIN: CPT | Performed by: FAMILY MEDICINE

## 2021-04-22 PROCEDURE — 3008F BODY MASS INDEX DOCD: CPT | Performed by: FAMILY MEDICINE

## 2021-04-22 PROCEDURE — 3079F DIAST BP 80-89 MM HG: CPT | Performed by: FAMILY MEDICINE

## 2021-04-22 PROCEDURE — 72050 X-RAY EXAM NECK SPINE 4/5VWS: CPT | Performed by: FAMILY MEDICINE

## 2021-04-22 PROCEDURE — 3074F SYST BP LT 130 MM HG: CPT | Performed by: FAMILY MEDICINE

## 2021-04-22 RX ORDER — HYDROCODONE BITARTRATE AND ACETAMINOPHEN 5; 325 MG/1; MG/1
TABLET ORAL
COMMUNITY
Start: 2021-03-29 | End: 2021-11-30

## 2021-04-22 NOTE — PROGRESS NOTES
Patient ID: Chris Moe is a 55year old female. HPI  Patient presents with:  Neck Pain: Pain radiates to R shoulder-no injuries    Last seen by me on 7/29/2019.     Pt c/o pain in the left side of the neck which radiates into the right shoulder wh aspiration  of breast cyst    • ACL tear     1998 R knee arthroscopy   • Anxiety state, unspecified 2006    drug therapy   • Breast cyst 7/2008 2008 abnormal mammogram, cyst aspiration via u/s   • Breast cyst 7/2006    abnormal mammogram, aspiration of [place] HFA (ATROVENT HFA) 17 MCG/ACT Inhalation Aero Soln Inhale 2 puffs into the lungs 3 (three) times daily as needed for Wheezing (can take closer to night time).  (Patient not taking: Reported on 4/22/2021 ) 1 Inhaler 0   • Albuterol Sulfate  (9 Physical Therapy ([facility] - [facility]          Order Comments:              Treatment: Evaluate & Treat and use Modalities if needed               Physician goals: Pain relief, Increased Function, Activities of daily living and Education

## 2021-04-23 ENCOUNTER — TELEPHONE (OUTPATIENT)
Dept: PHYSICAL THERAPY | Facility: HOSPITAL | Age: 56
End: 2021-04-23

## 2021-04-26 ENCOUNTER — OFFICE VISIT (OUTPATIENT)
Dept: PHYSICAL THERAPY | Age: 56
End: 2021-04-26
Attending: FAMILY MEDICINE
Payer: COMMERCIAL

## 2021-04-26 DIAGNOSIS — M62.838 NECK MUSCLE SPASM: ICD-10-CM

## 2021-04-26 DIAGNOSIS — M62.838 TRAPEZIUS MUSCLE SPASM: ICD-10-CM

## 2021-04-26 DIAGNOSIS — M54.2 NECK PAIN: ICD-10-CM

## 2021-04-26 PROCEDURE — 97161 PT EVAL LOW COMPLEX 20 MIN: CPT

## 2021-04-26 PROCEDURE — 97110 THERAPEUTIC EXERCISES: CPT

## 2021-04-26 NOTE — PROGRESS NOTES
P.T. EVALUATION:   Referring Physician: Dr. Marysol Antonio  Diagnosis: Neck pain (M54.2)  Trapezius muscle spasm (P87.466)  Neck muscle spasm (P00.373)  Date of Onset:  Date of Service: 4/26/2021     PATIENT Suzy Castillo is a 55year old y/o female hypomobility and pain    Strength/MMT:   Flx: R 4+/5, L 5/5  Abd: R 4+/5, L 5/5  ER: R 4+/5, L 5/5  IR: R 4+/5, L 5/5  Biceps: R 4+/5, L 5/5  Triceps: R 5/5, L 5/5    Posture: fair    Today’s Treatment and Response:  Patient education provided on PT sebastián alfaro

## 2021-04-28 ENCOUNTER — OFFICE VISIT (OUTPATIENT)
Dept: PHYSICAL THERAPY | Age: 56
End: 2021-04-28
Attending: FAMILY MEDICINE
Payer: COMMERCIAL

## 2021-04-28 PROCEDURE — 97140 MANUAL THERAPY 1/> REGIONS: CPT

## 2021-04-28 PROCEDURE — 97110 THERAPEUTIC EXERCISES: CPT

## 2021-04-28 NOTE — PROGRESS NOTES
Diagnosis: Neck pain (M54.2)  Trapezius muscle spasm (D97.299)  Neck muscle spasm (C75.782)  Date of Onset:         Next MD visit: none scheduled  Fall Risk: standard         Precautions: n/a          Medication Changes since last visit?: No    Subjective: verbalized understanding. Issued blue t-band. PT and patient goals are in progress.     Goals:    1- Pt will be I with maintenance and progression of HEP  2- Pt will demo increase cervical ROM to WNL to ease ability for pt to turn her head  3- Pt will repor

## 2021-05-04 ENCOUNTER — OFFICE VISIT (OUTPATIENT)
Dept: PHYSICAL THERAPY | Age: 56
End: 2021-05-04
Attending: FAMILY MEDICINE
Payer: COMMERCIAL

## 2021-05-04 PROCEDURE — 97140 MANUAL THERAPY 1/> REGIONS: CPT

## 2021-05-04 PROCEDURE — 97110 THERAPEUTIC EXERCISES: CPT

## 2021-05-04 NOTE — PROGRESS NOTES
Diagnosis: Neck pain (M54.2)  Trapezius muscle spasm (E89.119)  Neck muscle spasm (R98.555)  Date of Onset:         Next MD visit: none scheduled  Fall Risk: standard         Precautions: n/a          Medication Changes since last visit?: No    Subjective: x 10  - seated thoracic extension with ball OP 1 x 10   - seated scapular retraction with B shoulder ER with ball OP 3 x 10   - standing B narrow high row with GSC 3 x 10   - standing B shoulder extension with [address] 3 x 10  - standing B shoulder ER with [address]

## 2021-05-05 ENCOUNTER — APPOINTMENT (OUTPATIENT)
Dept: PHYSICAL THERAPY | Age: 56
End: 2021-05-05
Attending: FAMILY MEDICINE
Payer: COMMERCIAL

## 2021-05-05 ENCOUNTER — TELEPHONE (OUTPATIENT)
Dept: PHYSICAL THERAPY | Facility: HOSPITAL | Age: 56
End: 2021-05-05

## 2021-05-11 ENCOUNTER — APPOINTMENT (OUTPATIENT)
Dept: PHYSICAL THERAPY | Age: 56
End: 2021-05-11
Attending: FAMILY MEDICINE
Payer: COMMERCIAL

## 2021-05-12 ENCOUNTER — APPOINTMENT (OUTPATIENT)
Dept: PHYSICAL THERAPY | Age: 56
End: 2021-05-12
Attending: FAMILY MEDICINE
Payer: COMMERCIAL

## 2021-05-18 ENCOUNTER — APPOINTMENT (OUTPATIENT)
Dept: PHYSICAL THERAPY | Age: 56
End: 2021-05-18
Attending: FAMILY MEDICINE
Payer: COMMERCIAL

## 2021-05-20 ENCOUNTER — APPOINTMENT (OUTPATIENT)
Dept: PHYSICAL THERAPY | Age: 56
End: 2021-05-20
Attending: FAMILY MEDICINE
Payer: COMMERCIAL

## 2021-05-25 ENCOUNTER — APPOINTMENT (OUTPATIENT)
Dept: PHYSICAL THERAPY | Age: 56
End: 2021-05-25
Attending: FAMILY MEDICINE
Payer: COMMERCIAL

## 2021-05-27 ENCOUNTER — APPOINTMENT (OUTPATIENT)
Dept: PHYSICAL THERAPY | Age: 56
End: 2021-05-27
Attending: FAMILY MEDICINE
Payer: COMMERCIAL

## 2021-06-20 DIAGNOSIS — I10 ESSENTIAL HYPERTENSION: ICD-10-CM

## 2021-06-21 RX ORDER — LOSARTAN POTASSIUM 50 MG/1
TABLET ORAL
Qty: 90 TABLET | Refills: 0 | Status: SHIPPED | OUTPATIENT
Start: 2021-06-21 | End: 2021-09-21

## 2021-07-21 ENCOUNTER — TELEPHONE (OUTPATIENT)
Dept: OBGYN CLINIC | Facility: CLINIC | Age: 56
End: 2021-07-21

## 2021-07-21 ENCOUNTER — LAB ENCOUNTER (OUTPATIENT)
Dept: LAB | Age: 56
End: 2021-07-21
Attending: OBSTETRICS & GYNECOLOGY
Payer: COMMERCIAL

## 2021-07-21 DIAGNOSIS — R33.9 INCOMPLETE BLADDER EMPTYING: ICD-10-CM

## 2021-07-21 DIAGNOSIS — R30.0 BURNING WITH URINATION: ICD-10-CM

## 2021-07-21 DIAGNOSIS — R35.0 FREQUENT URINATION: ICD-10-CM

## 2021-07-21 DIAGNOSIS — R30.0 BURNING WITH URINATION: Primary | ICD-10-CM

## 2021-07-21 LAB
BILIRUB UR QL: NEGATIVE
CLARITY UR: CLEAR
COLOR UR: YELLOW
GLUCOSE UR-MCNC: NEGATIVE MG/DL
HGB UR QL STRIP.AUTO: NEGATIVE
KETONES UR-MCNC: NEGATIVE MG/DL
NITRITE UR QL STRIP.AUTO: NEGATIVE
PH UR: 6 [PH] (ref 5–8)
PROT UR-MCNC: 30 MG/DL
SP GR UR STRIP: 1.02 (ref 1–1.03)
UROBILINOGEN UR STRIP-ACNC: <2

## 2021-07-21 PROCEDURE — 81001 URINALYSIS AUTO W/SCOPE: CPT

## 2021-07-21 PROCEDURE — 87186 SC STD MICRODIL/AGAR DIL: CPT

## 2021-07-21 PROCEDURE — 87086 URINE CULTURE/COLONY COUNT: CPT

## 2021-07-21 PROCEDURE — 87077 CULTURE AEROBIC IDENTIFY: CPT

## 2021-07-21 NOTE — TELEPHONE ENCOUNTER
Pt states starting yesterday she has had burning with urination, frequent having to go every 3 mins and  not able to empty bladder. But denies any bleeding. Pt unable to really talk, states she is at work.  Pt encouraged to drink 64 oz of H20, instructed th

## 2021-07-22 DIAGNOSIS — E03.9 ACQUIRED HYPOTHYROIDISM: ICD-10-CM

## 2021-07-22 RX ORDER — LEVOTHYROXINE SODIUM 0.05 MG/1
TABLET ORAL
Qty: 90 TABLET | Refills: 0 | Status: SHIPPED | OUTPATIENT
Start: 2021-07-22 | End: 2021-10-14

## 2021-07-22 RX ORDER — SULFAMETHOXAZOLE AND TRIMETHOPRIM 800; 160 MG/1; MG/1
1 TABLET ORAL 2 TIMES DAILY
Qty: 6 TABLET | Refills: 0 | Status: SHIPPED | OUTPATIENT
Start: 2021-07-22 | End: 2021-07-25

## 2021-07-22 NOTE — TELEPHONE ENCOUNTER
Requested refill sent to pharmacy. Help patient schedule annual physical exam.  Was last seen in October 2020.

## 2021-07-22 NOTE — TELEPHONE ENCOUNTER
Not strong evidence of UTI but can use bactrim DS bid x 3d. Check on urine culture when back.  Push cranberry juice

## 2021-07-27 ENCOUNTER — TELEPHONE (OUTPATIENT)
Dept: OBGYN CLINIC | Facility: CLINIC | Age: 56
End: 2021-07-27

## 2021-07-27 NOTE — TELEPHONE ENCOUNTER
----- Message from Ronel Lopez MD sent at 7/27/2021  5:09 AM CDT -----  Please call pt and inform her of results attached -- Pt given bactrim but e coli resistant to bactrim.  See if pt still w/ Sx -- if yes, needs macrobid bid x 7d

## 2021-07-27 NOTE — TELEPHONE ENCOUNTER
Pt was advised her urine culture showed e-coli. Pt was given Bactrim. Pt states she is no longer having symptoms of a UTI. Pt was advised to call if her symptoms return. Pt agrees with plan.

## 2021-07-27 NOTE — PROGRESS NOTES
Please call pt and inform her of results attached -- Pt given bactrim but e coli resistant to bactrim.  See if pt still w/ Sx -- if yes, needs macrobid bid x 7d

## 2021-07-29 ENCOUNTER — OFFICE VISIT (OUTPATIENT)
Dept: FAMILY MEDICINE CLINIC | Facility: CLINIC | Age: 56
End: 2021-07-29
Payer: COMMERCIAL

## 2021-07-29 ENCOUNTER — NURSE TRIAGE (OUTPATIENT)
Dept: FAMILY MEDICINE CLINIC | Facility: CLINIC | Age: 56
End: 2021-07-29

## 2021-07-29 VITALS
BODY MASS INDEX: 29.23 KG/M2 | HEART RATE: 66 BPM | HEIGHT: 59 IN | DIASTOLIC BLOOD PRESSURE: 78 MMHG | SYSTOLIC BLOOD PRESSURE: 117 MMHG | WEIGHT: 145 LBS

## 2021-07-29 DIAGNOSIS — S90.851A FOREIGN BODY OF RIGHT HEEL: Primary | ICD-10-CM

## 2021-07-29 PROCEDURE — 10120 INC&RMVL FB SUBQ TISS SMPL: CPT | Performed by: NURSE PRACTITIONER

## 2021-07-29 PROCEDURE — 3074F SYST BP LT 130 MM HG: CPT | Performed by: NURSE PRACTITIONER

## 2021-07-29 PROCEDURE — 3008F BODY MASS INDEX DOCD: CPT | Performed by: NURSE PRACTITIONER

## 2021-07-29 PROCEDURE — 3078F DIAST BP <80 MM HG: CPT | Performed by: NURSE PRACTITIONER

## 2021-07-29 RX ORDER — SERTRALINE HYDROCHLORIDE 100 MG/1
100 TABLET, FILM COATED ORAL EVERY MORNING
COMMUNITY
Start: 2021-06-30

## 2021-07-29 RX ORDER — EZETIMIBE 10 MG/1
10 TABLET ORAL DAILY
COMMUNITY
Start: 2021-06-20 | End: 2021-09-27

## 2021-07-29 NOTE — TELEPHONE ENCOUNTER
Action Requested: Summary for Provider     []  Critical Lab, Recommendations Needed  [] Need Additional Advice  []   FYI    []   Need Orders  [] Need Medications Sent to Pharmacy  []  Other     SUMMARY: patient is experiencing pain that is intermittent to

## 2021-07-29 NOTE — PROGRESS NOTES
HPI    Patient presents for glass in right heel. Stepped on something on Sunday. Has bled once since. Review of Systems   Skin:        Foreign body right heel.     All other systems reviewed and are negative.        07/29/21  1146   BP: 117/78   Puls cyst  7/2006    aspiraton of breast cyst [APPROACH NOT STATED], abnormal mammogram   • Upper gi endoscopy,biopsy  2010    EGD with biopsy   • Us breast cyst aspiration (cpt=[id number])  7/2008    cyst aspiration via u/s, abnormal mammogram       Family Hi     Days of Exercise per Week:       Minutes of Exercise per Session:   Stress:       Feeling of Stress :   Social Connections:       Frequency of Communication with Friends and Family:       Frequency of Social Gatherings with Friends and Family:       She is alert and oriented to person, place, and time. Psychiatric:         Mood and Affect: Mood normal.         Behavior: Behavior normal.         Thought Content:  Thought content normal.         Judgment: Judgment normal.          Assessment and Plan:

## 2021-08-18 ENCOUNTER — TELEPHONE (OUTPATIENT)
Dept: OBGYN CLINIC | Facility: CLINIC | Age: 56
End: 2021-08-18

## 2021-08-18 DIAGNOSIS — B96.20 E. COLI URINARY TRACT INFECTION: Primary | ICD-10-CM

## 2021-08-18 DIAGNOSIS — N39.0 E. COLI URINARY TRACT INFECTION: Primary | ICD-10-CM

## 2021-08-18 RX ORDER — NITROFURANTOIN 25; 75 MG/1; MG/1
100 CAPSULE ORAL 2 TIMES DAILY
Qty: 14 CAPSULE | Refills: 0 | Status: SHIPPED | OUTPATIENT
Start: 2021-08-18 | End: 2021-08-25

## 2021-08-18 NOTE — TELEPHONE ENCOUNTER
PCP needs to be informed on pt being confused during conversation. I would have pt take meds since UTI can cause confusion in elderly.

## 2021-08-18 NOTE — TELEPHONE ENCOUNTER
Pt reports having urinary frequency and odor. Denies pain and burning with urination. See 7/21 and 7/27 TE. Asked pt if UTI symptoms resolved after taking Bactrim and now returned? Pt responded that she was never prescribed an antibiotic.  Referenced 7/21

## 2021-08-18 NOTE — TELEPHONE ENCOUNTER
Vi Pedersen RN calling from Dr. Janina Stein office, See note below    Dr. Janina Stein requesting to sent info to PCP as an Anselmo

## 2021-08-18 NOTE — TELEPHONE ENCOUNTER
Pt called and informed of NJG to take Marcobid BID for 7 days. Pharmacy confirmed and Rx sent. Pt denies body aches, chills or fever. States only burning and foul odor with urination.  Pt informed to take entire course of therapy, to increase H20 intake to

## 2021-08-18 NOTE — TELEPHONE ENCOUNTER
Pt has symptoms of UTi , said symptoms never completely went away asking for medication to be called in ,

## 2021-08-20 NOTE — TELEPHONE ENCOUNTER
Called and informed pt of [facility] recs, pt informed she should take two more dose and then stop medication. Pt verbalizes understanding.

## 2021-08-20 NOTE — TELEPHONE ENCOUNTER
Noted, please check in with patient at some point tomorrow to see that her symptoms are improving and to make sure she does not sound confused. I will be out of the office until 8/24/2021.   If she sounds confused or symptoms are worsening please have her

## 2021-08-20 NOTE — TELEPHONE ENCOUNTER
Pt states she took 1 pill of macrobid 2 nights ago, took 2 yesterday and one dose this morning. Pt states she started having an upset stomach and nausea the first night. Her stools are a little loose, but she states it is not diarrhea.   Pt is going on

## 2021-08-20 NOTE — TELEPHONE ENCOUNTER
Pt stomach and system disagreaing with medication. Stomach upset, and diarrhea. Please advise.     nitrofurantoin monohydrate macro (MACROBID) 100 MG Oral Cap 14 capsule 0 8/18/2021 8/25/2021   Sig:   Take 1 capsule (100 mg total) by mouth 2 (two) times

## 2021-09-21 DIAGNOSIS — I10 ESSENTIAL HYPERTENSION: ICD-10-CM

## 2021-09-21 RX ORDER — LOSARTAN POTASSIUM 50 MG/1
50 TABLET ORAL DAILY
Qty: 90 TABLET | Refills: 1 | Status: SHIPPED | OUTPATIENT
Start: 2021-09-21 | End: 2021-10-14

## 2021-09-21 NOTE — TELEPHONE ENCOUNTER
Please review. Protocol failed / No protocol.    Requested Prescriptions   Pending Prescriptions Disp Refills    LOSARTAN POTASSIUM 50 MG Oral Tab [Pharmacy Med Name: Losartan Potassium 50 Mg Tab [facility]] 90 tablet 0     Sig: TAKE ONE TABLET BY MOUTH ONE TIME

## 2021-09-27 RX ORDER — EZETIMIBE 10 MG/1
TABLET ORAL
Qty: 90 TABLET | Refills: 0 | Status: SHIPPED | OUTPATIENT
Start: 2021-09-27 | End: 2021-10-14

## 2021-09-27 NOTE — TELEPHONE ENCOUNTER
Patient called and patient is out of cholesterol medication and needs. Today. Request on 9/23/21. Has appt 10/14/21    LAB scanned dated 9/22/20 labs cholesterol : triglycerides 83, total chol 186, [id number], HDL 59.

## 2021-10-04 ENCOUNTER — MED REC SCAN ONLY (OUTPATIENT)
Dept: FAMILY MEDICINE CLINIC | Facility: CLINIC | Age: 56
End: 2021-10-04

## 2021-10-14 ENCOUNTER — OFFICE VISIT (OUTPATIENT)
Dept: FAMILY MEDICINE CLINIC | Facility: CLINIC | Age: 56
End: 2021-10-14
Payer: COMMERCIAL

## 2021-10-14 VITALS
DIASTOLIC BLOOD PRESSURE: 82 MMHG | WEIGHT: 146 LBS | SYSTOLIC BLOOD PRESSURE: 126 MMHG | HEART RATE: 75 BPM | TEMPERATURE: 99 F | BODY MASS INDEX: 29.43 KG/M2 | HEIGHT: 59 IN

## 2021-10-14 DIAGNOSIS — E78.00 HYPERCHOLESTEROLEMIA: ICD-10-CM

## 2021-10-14 DIAGNOSIS — E03.9 ACQUIRED HYPOTHYROIDISM: ICD-10-CM

## 2021-10-14 DIAGNOSIS — Z12.31 ENCOUNTER FOR SCREENING MAMMOGRAM FOR MALIGNANT NEOPLASM OF BREAST: ICD-10-CM

## 2021-10-14 DIAGNOSIS — Z00.00 ANNUAL PHYSICAL EXAM: Primary | ICD-10-CM

## 2021-10-14 DIAGNOSIS — Z23 NEEDS FLU SHOT: ICD-10-CM

## 2021-10-14 DIAGNOSIS — I10 ESSENTIAL HYPERTENSION: ICD-10-CM

## 2021-10-14 PROCEDURE — 90686 IIV4 VACC NO PRSV 0.5 ML IM: CPT | Performed by: FAMILY MEDICINE

## 2021-10-14 PROCEDURE — 3079F DIAST BP 80-89 MM HG: CPT | Performed by: FAMILY MEDICINE

## 2021-10-14 PROCEDURE — 90471 IMMUNIZATION ADMIN: CPT | Performed by: FAMILY MEDICINE

## 2021-10-14 PROCEDURE — 99396 PREV VISIT EST AGE 40-64: CPT | Performed by: FAMILY MEDICINE

## 2021-10-14 PROCEDURE — 3074F SYST BP LT 130 MM HG: CPT | Performed by: FAMILY MEDICINE

## 2021-10-14 PROCEDURE — 3008F BODY MASS INDEX DOCD: CPT | Performed by: FAMILY MEDICINE

## 2021-10-14 RX ORDER — EZETIMIBE 10 MG/1
10 TABLET ORAL DAILY
Qty: 90 TABLET | Refills: 3 | COMMUNITY
Start: 2021-12-22 | End: 2021-12-17

## 2021-10-14 RX ORDER — LOSARTAN POTASSIUM 50 MG/1
50 TABLET ORAL DAILY
Qty: 90 TABLET | Refills: 3 | Status: SHIPPED | OUTPATIENT
Start: 2021-10-14

## 2021-10-14 RX ORDER — LEVOTHYROXINE SODIUM 0.05 MG/1
50 TABLET ORAL DAILY
Qty: 90 TABLET | Refills: 0 | Status: SHIPPED | OUTPATIENT
Start: 2021-10-14 | End: 2021-10-16

## 2021-10-14 NOTE — PROGRESS NOTES
HPI:   Tabitha Mcclain is a 55year old female who presents for a complete physical exam.    Work: Works in an office.    Social: Lives with family,  and kids grown  Diet: eats home cooked meals, salads daily, trying to cut carbs  Exercise: active, unspecified 2006    drug therapy   • Breast cyst 7/2008 2008 abnormal mammogram, cyst aspiration via u/s   • Breast cyst 7/2006    abnormal mammogram, aspiration of breast cyst   • Depression 2006    drug therapy   • Esophagitis    • Gastritis     2010 well nourished, in no apparent distress  SKIN: no rashes, no suspicious lesions  HEENT: atraumatic, normocephalic, ears are clear  EYES: PERRLA, EOMI,conjunctiva are clear  NECK: supple, no adenopathy  LUNGS: clear to auscultation  CARDIO: RRR without murm PM

## 2021-10-16 DIAGNOSIS — E03.9 ACQUIRED HYPOTHYROIDISM: ICD-10-CM

## 2021-10-17 NOTE — TELEPHONE ENCOUNTER
Please review refill protocol failed/ no protocol  Requested Prescriptions   Pending Prescriptions Disp Refills    LEVOTHYROXINE 50 MCG Oral Tab [Pharmacy Med Name: Levothyroxine Sodium 50 Mcg Tab [facility]] 90 tablet 0     Sig: TAKE ONE TABLET BY MOUTH ONE ANJUM

## 2021-10-18 RX ORDER — LEVOTHYROXINE SODIUM 0.05 MG/1
50 TABLET ORAL DAILY
Qty: 90 TABLET | Refills: 1 | Status: SHIPPED | OUTPATIENT
Start: 2021-10-18

## 2021-10-25 NOTE — TELEPHONE ENCOUNTER
Pt asking if she should continue taking naproxen? Pls advise thank you. Body Location Override (Optional - Billing Will Still Be Based On Selected Body Map Location If Applicable): mid crown Detail Level: Detailed Depth Of Biopsy: dermis Was A Bandage Applied: Yes Size Of Lesion In Cm: 0 Anticipated Plan (Based On Presumed Biopsy Results): Magdy Wesley Biopsy Type: H and E Biopsy Method: Dermablade Anesthesia Type: 1% lidocaine with epinephrine and a 1:10 solution of 8.4% sodium bicarbonate Anesthesia Volume In Cc (Will Not Render If 0): 0.5 Hemostasis: Aluminum Chloride and Electrocautery Wound Care: Vaseline Dressing: bandage Destruction After The Procedure: No Type Of Destruction Used: Curettage Curettage Text: The wound bed was treated with curettage after the biopsy was performed. Cryotherapy Text: The wound bed was treated with cryotherapy after the biopsy was performed. Electrodesiccation Text: The wound bed was treated with electrodesiccation after the biopsy was performed. Electrodesiccation And Curettage Text: The wound bed was treated with electrodesiccation and curettage after the biopsy was performed. Silver Nitrate Text: The wound bed was treated with silver nitrate after the biopsy was performed. Lab: [facility] Lab Facility: [id number] [facility] Consent: The providers intent is to obtain a tissue sample solely for diagnostic purposes. Written consent was obtained and risks were reviewed including but not limited to scarring, infection, bleeding, scabbing, incomplete removal, nerve damage and allergy to anesthesia. Post-Care Instructions: I reviewed with the patient in detail post-care instructions. Patient is wash site daily with soap and water and apply vaseline until healed. Notification Instructions: Patient will be notified of biopsy results. However, patient instructed to call the office if not contacted within 2 weeks. Billing Type: [employer] Information: Selecting Yes will display possible errors in your note based on the variables you have selected. This validation is only offered as a suggestion for you. PLEASE NOTE THAT THE VALIDATION TEXT WILL BE REMOVED WHEN YOU FINALIZE YOUR NOTE. IF YOU WANT TO FAX A PRELIMINARY NOTE YOU WILL NEED TO TOGGLE THIS TO 'NO' IF YOU DO NOT WANT IT IN YOUR FAXED NOTE. Body Location Override (Optional - Billing Will Still Be Based On Selected Body Map Location If Applicable): right nasal sidewall Anticipated Plan (Based On Presumed Biopsy Results): M Lab: 249 Lab Facility: [id number] Billing Type: Third-Party Bill

## 2021-11-16 ENCOUNTER — NURSE TRIAGE (OUTPATIENT)
Dept: FAMILY MEDICINE CLINIC | Facility: CLINIC | Age: 56
End: 2021-11-16

## 2021-11-16 RX ORDER — AMOXICILLIN AND CLAVULANATE POTASSIUM 875; 125 MG/1; MG/1
1 TABLET, FILM COATED ORAL 2 TIMES DAILY
Qty: 20 TABLET | Refills: 0 | Status: SHIPPED | OUTPATIENT
Start: 2021-11-16 | End: 2021-11-26

## 2021-11-16 NOTE — TELEPHONE ENCOUNTER
Reason for Disposition  • Sinus congestion (pressure, fullness) present > 10 days    Protocols used: SINUS PAIN OR CONGESTION-A-OH    Action Requested: Summary for Provider     []  Critical Lab, Recommendations Needed  [] Need Additional Advice  []   MARIBEL

## 2021-11-23 ENCOUNTER — HOSPITAL ENCOUNTER (OUTPATIENT)
Dept: MAMMOGRAPHY | Age: 56
Discharge: HOME | End: 2021-11-23
Attending: FAMILY MEDICINE
Payer: COMMERCIAL

## 2021-11-23 DIAGNOSIS — Z12.31 ENCOUNTER FOR SCREENING MAMMOGRAM FOR MALIGNANT NEOPLASM OF BREAST: ICD-10-CM

## 2021-11-23 PROCEDURE — 77063 BREAST TOMOSYNTHESIS BI: CPT | Performed by: FAMILY MEDICINE

## 2021-11-23 PROCEDURE — 77067 SCR MAMMO BI INCL CAD: CPT | Performed by: FAMILY MEDICINE

## 2021-11-24 ENCOUNTER — TELEPHONE (OUTPATIENT)
Dept: FAMILY MEDICINE CLINIC | Facility: CLINIC | Age: 56
End: 2021-11-24

## 2021-11-24 RX ORDER — BENZONATATE 200 MG/1
200 CAPSULE ORAL 3 TIMES DAILY PRN
Qty: 30 CAPSULE | Refills: 0 | Status: SHIPPED | OUTPATIENT
Start: 2021-11-24

## 2021-11-24 NOTE — TELEPHONE ENCOUNTER
Patient states she was prescribed an antibiotic by Michelle Cagle for a sinus infection on 11/16/21. Patient states she is still coughing, now for 3 weeks. Cough is now persistent and keeping her up at night.  Patient is asking if a cough medication can be prescr

## 2021-11-29 NOTE — TELEPHONE ENCOUNTER
Pt had OV scheduled today with Ivan MILES and states she needs to cancel due to her daughter being in the hospital.    Pt asking if she can just have Rx sent to her pharmacy.   \"I think I need a different antibiotic and steroids\"    Pt states still

## 2021-11-29 NOTE — TELEPHONE ENCOUNTER
Dr. Everton Majano and Em Gutierres APRN - appointment scheduled for tomorrow, using Resp/GI Appt protocol. Patient called office to see if response from Doctor or APRN. RN spoke with patient. Patient's date of birth and full name both confirmed.      RN advised

## 2021-11-30 ENCOUNTER — OFFICE VISIT (OUTPATIENT)
Dept: FAMILY MEDICINE CLINIC | Facility: CLINIC | Age: 56
End: 2021-11-30
Payer: COMMERCIAL

## 2021-11-30 VITALS
TEMPERATURE: 99 F | BODY MASS INDEX: 29.23 KG/M2 | HEART RATE: 76 BPM | WEIGHT: 145 LBS | DIASTOLIC BLOOD PRESSURE: 71 MMHG | HEIGHT: 59 IN | SYSTOLIC BLOOD PRESSURE: 106 MMHG

## 2021-11-30 DIAGNOSIS — J01.00 ACUTE MAXILLARY SINUSITIS, RECURRENCE NOT SPECIFIED: Primary | ICD-10-CM

## 2021-11-30 PROCEDURE — 3074F SYST BP LT 130 MM HG: CPT | Performed by: NURSE PRACTITIONER

## 2021-11-30 PROCEDURE — 99213 OFFICE O/P EST LOW 20 MIN: CPT | Performed by: NURSE PRACTITIONER

## 2021-11-30 PROCEDURE — 3008F BODY MASS INDEX DOCD: CPT | Performed by: NURSE PRACTITIONER

## 2021-11-30 PROCEDURE — 3078F DIAST BP <80 MM HG: CPT | Performed by: NURSE PRACTITIONER

## 2021-11-30 RX ORDER — LEVOFLOXACIN 500 MG/1
500 TABLET, FILM COATED ORAL DAILY
Qty: 7 TABLET | Refills: 0 | Status: SHIPPED | OUTPATIENT
Start: 2021-11-30

## 2021-11-30 NOTE — PROGRESS NOTES
HPI    Patient presents for cough on and off for the past 2 weeks. With intermittent headache and sinus congestion for the past month. Was treated with a 10 day course of Augmentin on 11/16. Mucus from nose is constant and yellow.       Was tested for co      •      • Colonoscopy     • Cyst aspiration right     • Knee arthroscopy Right     R knee arthroscopy   • Mammogram, both breasts  2008    abnormal mammogram, breast cyst   • Mammogram, both breasts  2006    abnorm Health  Financial Resource Strain: Not on file  Food Insecurity: Not on file  Transportation Needs: Not on file  Physical Activity: Not on file  Stress: Not on file  Social Connections: Not on file  Intimate Partner Violence: Not on file  Housing Stability Behavior: Behavior normal.         Thought Content:  Thought content normal.         Judgment: Judgment normal.          Assessment and Plan:  Problem List Items Addressed This Visit     None      Visit Diagnoses     Acute maxillary sinusitis, recurrence no

## 2021-12-04 ENCOUNTER — TELEPHONE (OUTPATIENT)
Dept: FAMILY MEDICINE CLINIC | Facility: CLINIC | Age: 56
End: 2021-12-04

## 2021-12-04 DIAGNOSIS — R09.81 SINUS CONGESTION: Primary | ICD-10-CM

## 2021-12-04 NOTE — TELEPHONE ENCOUNTER
Patient states she was seen in the office 11/30/21 and was told to call back if no improvement. Patient states symptoms are the same, no improvement. She has been taking the antibiotic, has tried pseudoephed,cough medicine, and antibiotics.      Patient sta

## 2021-12-06 RX ORDER — PREDNISONE 20 MG/1
TABLET ORAL
Qty: 10 TABLET | Refills: 0 | Status: SHIPPED | OUTPATIENT
Start: 2021-12-06

## 2021-12-06 NOTE — TELEPHONE ENCOUNTER
Spoke with pt,  verified  Pt was informed of [place] recommendation, pt stated understanding. No future appointments.

## 2021-12-16 DIAGNOSIS — E78.00 HYPERCHOLESTEROLEMIA: ICD-10-CM

## 2021-12-17 RX ORDER — EZETIMIBE 10 MG/1
TABLET ORAL
Qty: 90 TABLET | Refills: 0 | Status: SHIPPED | OUTPATIENT
Start: 2021-12-17 | End: 2022-01-31

## 2021-12-17 NOTE — TELEPHONE ENCOUNTER
Please review. Protocol failed/ No protocol      Requested Prescriptions   Pending Prescriptions Disp Refills    EZETIMIBE 10 MG Oral Tab [Pharmacy Med Name: Ezetimibe 10 Mg Tab [facility]] 90 tablet 0     Sig: TAKE ONE TABLET BY MOUTH ONE TIME DAILY        [place]

## 2021-12-20 RX ORDER — PSEUDOEPHEDRINE HCL 120 MG/1
120 TABLET, FILM COATED, EXTENDED RELEASE ORAL EVERY 12 HOURS
Qty: 20 TABLET | Refills: 0 | Status: SHIPPED | OUTPATIENT
Start: 2021-12-20

## 2021-12-20 NOTE — TELEPHONE ENCOUNTER
Spoke to pt. She states she will try the supportive care and will  the script from her pharm.  She states when she does blow her nose or cough up phlegm it is not any colors like it was in the beginning, she feels she is getting better but will try t

## 2021-12-20 NOTE — TELEPHONE ENCOUNTER
Pt calling back stated after taking steroids still have s/s feels something is stuck in her throat, r/t sinus , eyes puffy, still coughing , sinus h/a, statedshe felt after the steroid she would be better  Was seen in office 11/30/21, 12/4/21 called not be

## 2021-12-20 NOTE — TELEPHONE ENCOUNTER
Sounds like patient has taken a course of Augmentin, Levaquin and steroid. Would hold off on further antibiotics for now.  She still congested with a sinus headache would advise supportive care, steam from the shower, Neti pot, humidifier, Sudafed/deconges

## 2022-01-29 DIAGNOSIS — E78.00 HYPERCHOLESTEROLEMIA: ICD-10-CM

## 2022-01-31 RX ORDER — EZETIMIBE 10 MG/1
10 TABLET ORAL DAILY
Qty: 90 TABLET | Refills: 1 | Status: SHIPPED | OUTPATIENT
Start: 2022-01-31

## 2022-01-31 NOTE — TELEPHONE ENCOUNTER
Please review Protocol Failed/No Protocol        Requested Prescriptions   Pending Prescriptions Disp Refills    EZETIMIBE 10 MG Oral Tab [Pharmacy Med Name: Ezetimibe 10 Mg Tab [facility]] 90 tablet 0     Sig: TAKE ONE TABLET BY MOUTH ONE TIME DAILY        Chol

## 2022-03-24 ENCOUNTER — NURSE TRIAGE (OUTPATIENT)
Dept: FAMILY MEDICINE CLINIC | Facility: CLINIC | Age: 57
End: 2022-03-24

## 2022-03-24 ENCOUNTER — TELEMEDICINE (OUTPATIENT)
Dept: FAMILY MEDICINE CLINIC | Facility: CLINIC | Age: 57
End: 2022-03-24

## 2022-03-24 DIAGNOSIS — J01.10 ACUTE FRONTAL SINUSITIS, RECURRENCE NOT SPECIFIED: Primary | ICD-10-CM

## 2022-03-24 PROCEDURE — 99214 OFFICE O/P EST MOD 30 MIN: CPT | Performed by: NURSE PRACTITIONER

## 2022-03-24 RX ORDER — AZITHROMYCIN 250 MG/1
TABLET, FILM COATED ORAL
Qty: 6 TABLET | Refills: 0 | Status: SHIPPED | OUTPATIENT
Start: 2022-03-24 | End: 2022-03-29

## 2022-03-25 ENCOUNTER — TELEPHONE (OUTPATIENT)
Dept: FAMILY MEDICINE CLINIC | Facility: CLINIC | Age: 57
End: 2022-03-25

## 2022-03-25 RX ORDER — CODEINE PHOSPHATE AND GUAIFENESIN 10; 100 MG/5ML; MG/5ML
5 SOLUTION ORAL EVERY 6 HOURS PRN
Qty: 118 ML | Refills: 0 | Status: SHIPPED | OUTPATIENT
Start: 2022-03-25

## 2022-03-25 NOTE — TELEPHONE ENCOUNTER
Informed patient by phone that rx was sent to pharmacy. Pt thanked me for the call. Closing encounter.

## 2022-03-25 NOTE — TELEPHONE ENCOUNTER
[facility]-RAFAELA, APRN:  Patient is asking if you can prescribe cough syrup with codeine. Patient has left over benzonatate at home and doesn't help her cough. She c/o constant coughing. Had televisit 3/24/22.

## 2022-06-03 ENCOUNTER — NURSE TRIAGE (OUTPATIENT)
Dept: FAMILY MEDICINE CLINIC | Facility: CLINIC | Age: 57
End: 2022-06-03

## 2022-06-03 ENCOUNTER — TELEMEDICINE (OUTPATIENT)
Dept: FAMILY MEDICINE CLINIC | Facility: CLINIC | Age: 57
End: 2022-06-03

## 2022-06-03 DIAGNOSIS — R05.9 COUGH: Primary | ICD-10-CM

## 2022-06-03 PROCEDURE — 99213 OFFICE O/P EST LOW 20 MIN: CPT | Performed by: FAMILY MEDICINE

## 2022-06-03 RX ORDER — GUAIFENESIN AND CODEINE PHOSPHATE 100; 10 MG/5ML; MG/5ML
5 SOLUTION ORAL NIGHTLY
Qty: 100 ML | Refills: 0 | Status: SHIPPED | OUTPATIENT
Start: 2022-06-03

## 2022-06-03 RX ORDER — AZITHROMYCIN 250 MG/1
TABLET, FILM COATED ORAL
Qty: 6 TABLET | Refills: 0 | Status: SHIPPED | OUTPATIENT
Start: 2022-06-03 | End: 2022-06-08

## 2022-06-03 NOTE — PROGRESS NOTES
Virtual Telephone Check-In    Nadir Groves verbally consents to a Virtual/Telephone Check-In service on 06/03/22. Patient understands and accepts financial responsibility for any deductible, co-insurance and/or co-pays associated with this service. Duration of the service: 15 minutes  This telemedicine visit was conducted using live audio and video. Summary of topics discussed:     Patient with new symptoms of coughing, congestion and production of brown phlegm. Denies facial pain or postnasal drip. No known exposure to COVID. Has been taking cough syrup which has been helping suppress her cough. No reported chest pain shortness of breath or dizziness. Physical Exam:  General: alert, speaking in full sentences, no acute distress  Lungs: respirations sound unlabored, no audible wheezing with speaking. Neurologic: alert, oriented x3    Assessment and plan:    Cough  - discussed supportive care, humidifier use, steam from the shower, teas and broths to help thin out mucous. Salt water gargles for throat pain. Tylenol PRN for pain and fever. Advised patient to hold off on antibiotic use unless she develops worsening symptoms including facial pressure or fever.  - azithromycin 250 MG Oral Tab; Take 2 tablets (500 mg total) by mouth daily for 1 day, THEN 1 tablet (250 mg total) daily for 4 days. Dispense: 6 tablet; Refill: 0  - guaiFENesin-Codeine 100-10 MG/5ML Oral Syrup; Take 5 mL by mouth nightly. Dispense: 100 mL; Refill: 0      Advised to call back clinic if no improvement in symptoms. Red flags discussed to go to ER.      Uday Mcgraw MD

## 2022-08-20 DIAGNOSIS — E03.9 ACQUIRED HYPOTHYROIDISM: ICD-10-CM

## 2022-08-23 RX ORDER — LEVOTHYROXINE SODIUM 0.05 MG/1
TABLET ORAL
Qty: 90 TABLET | Refills: 0 | Status: SHIPPED | OUTPATIENT
Start: 2022-08-23

## 2022-09-15 DIAGNOSIS — E78.00 HYPERCHOLESTEROLEMIA: ICD-10-CM

## 2022-09-15 NOTE — TELEPHONE ENCOUNTER
Protocol failed or has No Protocol, please review  Requested Prescriptions   Pending Prescriptions Disp Refills    EZETIMIBE 10 MG Oral Tab [Pharmacy Med Name: Ezetimibe 10 Mg Tab [facility]] 90 tablet 0     Sig: TAKE ONE TABLET BY MOUTH ONE TIME DAILY        Cholesterol Medication Protocol Failed - 9/15/2022  1:30 AM        Failed - ALT in past 12 months        Failed - LDL in past 12 months        Failed - Last ALT < 80       Lab Results   Component Value Date    ALT 46 12/19/2019             Failed - Last LDL < 130     Lab Results   Component Value Date    LDL 90 12/19/2019               Passed - In person appointment or virtual visit in the past 12 mos or appointment in next 3 mos       Recent Outpatient Visits              3 months ago Cough    [address] Janet Boss MD    Telemedicine    5 months ago Acute frontal sinusitis, recurrence not specified    [employer], [address], [address]    9 months ago Acute maxillary sinusitis, recurrence not specified    [employer], [employer], [address], [address] [facility], APRN    Office Visit    11 months ago Annual physical exam    [facility], [address] MD    Office Visit    1 year ago Foreign body of right heel    [facility] [employer], [employer], [address], [address] [facility] Vanesa [place]alera, [address]    Office Visit                       Recent Outpatient Visits              3 months ago Cough    [facility] [employer], [address] [address] MD    Telemedicine    5 months ago Acute frontal sinusitis, recurrence not specified    [employer], [employer], [address], [address] [facility], [address] [place] [address]    9 months ago Acute maxillary sinusitis, recurrence not specified    [facility] [employer], [employer], [address], [address] [facility], APRN    Office Visit    11 months ago Annual physical exam    [address] Janet Boss MD    Office Visit    1 year ago Foreign body of right heel [address] [facility], [address] [place] [address] Héctor Escalera, [address]    Office Visit

## 2022-09-17 RX ORDER — EZETIMIBE 10 MG/1
10 TABLET ORAL DAILY
Qty: 90 TABLET | Refills: 1 | Status: SHIPPED | OUTPATIENT
Start: 2022-09-17

## 2022-10-03 ENCOUNTER — TELEPHONE (OUTPATIENT)
Dept: FAMILY MEDICINE CLINIC | Facility: CLINIC | Age: 57
End: 2022-10-03

## 2022-10-03 NOTE — TELEPHONE ENCOUNTER
Spoke to patient. Relayed that an appointment is recommended to discuss medication. She faxed the results about 20 minutes ago. She is unsure what the numbers are but did schedule a physical for 10/18. [facility], please keep an eye out for patient's blood work and scan into chart. Thank you.     Future Appointments   Date Time Provider Hyun Kimbrough   10/18/2022 11:30 AM Cherise Croft MD [facility]

## 2022-10-03 NOTE — TELEPHONE ENCOUNTER
Patient states she had blood work through her job, and her cholesterol and hgb are high. Would like to know if she needs to be put on medication. Would like to speak to a nurse.

## 2022-10-10 NOTE — TELEPHONE ENCOUNTER
Patient states she will refax today. She would like a call back to confirm if office received it or not.

## 2022-10-11 ENCOUNTER — PATIENT MESSAGE (OUTPATIENT)
Dept: INTERNAL MEDICINE CLINIC | Facility: CLINIC | Age: 57
End: 2022-10-11

## 2022-10-11 NOTE — TELEPHONE ENCOUNTER
Please let patient know that I have reviewed her lab results from 9/30 and her cholesterol was mildly elevated, her diabetes screen showed that she is prediabetic. I do not really think it is worthwhile to repeat given that its only been a couple weeks. When she comes to see me in the office I will give her blood work orders to repeat her labs in 3 months. The A1c /diabetes screen should not be repeated more often than every 3 months.

## 2022-10-18 ENCOUNTER — LAB ENCOUNTER (OUTPATIENT)
Dept: LAB | Age: 57
End: 2022-10-18
Attending: NURSE ANESTHETIST, CERTIFIED REGISTERED
Payer: COMMERCIAL

## 2022-10-18 ENCOUNTER — PATIENT MESSAGE (OUTPATIENT)
Dept: INTERNAL MEDICINE CLINIC | Facility: CLINIC | Age: 57
End: 2022-10-18

## 2022-10-18 ENCOUNTER — OFFICE VISIT (OUTPATIENT)
Dept: FAMILY MEDICINE CLINIC | Facility: CLINIC | Age: 57
End: 2022-10-18
Payer: COMMERCIAL

## 2022-10-18 VITALS
HEART RATE: 62 BPM | TEMPERATURE: 98 F | DIASTOLIC BLOOD PRESSURE: 70 MMHG | WEIGHT: 152 LBS | SYSTOLIC BLOOD PRESSURE: 106 MMHG | HEIGHT: 59 IN | BODY MASS INDEX: 30.64 KG/M2

## 2022-10-18 DIAGNOSIS — Z12.31 ENCOUNTER FOR SCREENING MAMMOGRAM FOR MALIGNANT NEOPLASM OF BREAST: ICD-10-CM

## 2022-10-18 DIAGNOSIS — Z00.00 ENCOUNTER FOR ANNUAL HEALTH EXAMINATION: Primary | ICD-10-CM

## 2022-10-18 DIAGNOSIS — Z00.00 ENCOUNTER FOR ANNUAL HEALTH EXAMINATION: ICD-10-CM

## 2022-10-18 LAB
ALBUMIN SERPL-MCNC: 3.7 G/DL (ref 3.4–5)
ALBUMIN/GLOB SERPL: 1.2 {RATIO} (ref 1–2)
ALP LIVER SERPL-CCNC: 63 U/L
ALT SERPL-CCNC: 31 U/L
ANION GAP SERPL CALC-SCNC: 5 MMOL/L (ref 0–18)
AST SERPL-CCNC: 20 U/L (ref 15–37)
BASOPHILS # BLD AUTO: 0.02 X10(3) UL (ref 0–0.2)
BASOPHILS NFR BLD AUTO: 0.6 %
BILIRUB SERPL-MCNC: 0.3 MG/DL (ref 0.1–2)
BUN BLD-MCNC: 10 MG/DL (ref 7–18)
BUN/CREAT SERPL: 16.7 (ref 10–20)
CALCIUM BLD-MCNC: 9 MG/DL (ref 8.5–10.1)
CHLORIDE SERPL-SCNC: 108 MMOL/L (ref 98–112)
CHOLEST SERPL-MCNC: 176 MG/DL (ref ?–200)
CO2 SERPL-SCNC: 27 MMOL/L (ref 21–32)
CREAT BLD-MCNC: 0.6 MG/DL
DEPRECATED RDW RBC AUTO: 45.8 FL (ref 35.1–46.3)
EOSINOPHIL # BLD AUTO: 0.04 X10(3) UL (ref 0–0.7)
EOSINOPHIL NFR BLD AUTO: 1.2 %
ERYTHROCYTE [DISTWIDTH] IN BLOOD BY AUTOMATED COUNT: 12.9 % (ref 11–15)
EST. AVERAGE GLUCOSE BLD GHB EST-MCNC: 117 MG/DL (ref 68–126)
FASTING PATIENT LIPID ANSWER: YES
FASTING STATUS PATIENT QL REPORTED: YES
GFR SERPLBLD BASED ON 1.73 SQ M-ARVRAT: 105 ML/MIN/1.73M2 (ref 60–?)
GLOBULIN PLAS-MCNC: 3.1 G/DL (ref 2.8–4.4)
GLUCOSE BLD-MCNC: 86 MG/DL (ref 70–99)
HBA1C MFR BLD: 5.7 % (ref ?–5.7)
HCT VFR BLD AUTO: 36.7 %
HDLC SERPL-MCNC: 66 MG/DL (ref 40–59)
HGB BLD-MCNC: 12.4 G/DL
IMM GRANULOCYTES # BLD AUTO: 0 X10(3) UL (ref 0–1)
IMM GRANULOCYTES NFR BLD: 0 %
LDLC SERPL CALC-MCNC: 96 MG/DL (ref ?–100)
LYMPHOCYTES # BLD AUTO: 1.68 X10(3) UL (ref 1–4)
LYMPHOCYTES NFR BLD AUTO: 51.2 %
MCH RBC QN AUTO: 32.5 PG (ref 26–34)
MCHC RBC AUTO-ENTMCNC: 33.8 G/DL (ref 31–37)
MCV RBC AUTO: 96.1 FL
MONOCYTES # BLD AUTO: 0.38 X10(3) UL (ref 0.1–1)
MONOCYTES NFR BLD AUTO: 11.6 %
NEUTROPHILS # BLD AUTO: 1.16 X10 (3) UL (ref 1.5–7.7)
NEUTROPHILS # BLD AUTO: 1.16 X10(3) UL (ref 1.5–7.7)
NEUTROPHILS NFR BLD AUTO: 35.4 %
NONHDLC SERPL-MCNC: 110 MG/DL (ref ?–130)
OSMOLALITY SERPL CALC.SUM OF ELEC: 288 MOSM/KG (ref 275–295)
PLATELET # BLD AUTO: 245 10(3)UL (ref 150–450)
POTASSIUM SERPL-SCNC: 3.9 MMOL/L (ref 3.5–5.1)
PROT SERPL-MCNC: 6.8 G/DL (ref 6.4–8.2)
RBC # BLD AUTO: 3.82 X10(6)UL
SODIUM SERPL-SCNC: 140 MMOL/L (ref 136–145)
TRIGL SERPL-MCNC: 73 MG/DL (ref 30–149)
TSI SER-ACNC: 2.45 MIU/ML (ref 0.36–3.74)
VLDLC SERPL CALC-MCNC: 12 MG/DL (ref 0–30)
WBC # BLD AUTO: 3.3 X10(3) UL (ref 4–11)

## 2022-10-18 PROCEDURE — 3078F DIAST BP <80 MM HG: CPT | Performed by: FAMILY MEDICINE

## 2022-10-18 PROCEDURE — 84443 ASSAY THYROID STIM HORMONE: CPT

## 2022-10-18 PROCEDURE — 3008F BODY MASS INDEX DOCD: CPT | Performed by: FAMILY MEDICINE

## 2022-10-18 PROCEDURE — 83036 HEMOGLOBIN GLYCOSYLATED A1C: CPT

## 2022-10-18 PROCEDURE — 85025 COMPLETE CBC W/AUTO DIFF WBC: CPT

## 2022-10-18 PROCEDURE — 99396 PREV VISIT EST AGE 40-64: CPT | Performed by: FAMILY MEDICINE

## 2022-10-18 PROCEDURE — 36415 COLL VENOUS BLD VENIPUNCTURE: CPT

## 2022-10-18 PROCEDURE — 80061 LIPID PANEL: CPT

## 2022-10-18 PROCEDURE — 80053 COMPREHEN METABOLIC PANEL: CPT

## 2022-10-18 PROCEDURE — 3074F SYST BP LT 130 MM HG: CPT | Performed by: FAMILY MEDICINE

## 2022-10-20 ENCOUNTER — PATIENT MESSAGE (OUTPATIENT)
Dept: INTERNAL MEDICINE CLINIC | Facility: CLINIC | Age: 57
End: 2022-10-20

## 2022-10-21 PROBLEM — R73.03 PREDIABETES: Status: ACTIVE | Noted: 2022-10-21

## 2022-12-13 ENCOUNTER — NURSE TRIAGE (OUTPATIENT)
Dept: FAMILY MEDICINE CLINIC | Facility: CLINIC | Age: 57
End: 2022-12-13

## 2022-12-13 ENCOUNTER — HOSPITAL ENCOUNTER (OUTPATIENT)
Dept: MAMMOGRAPHY | Age: 57
Discharge: HOME | End: 2022-12-13
Attending: FAMILY MEDICINE
Payer: COMMERCIAL

## 2022-12-13 DIAGNOSIS — Z12.31 ENCOUNTER FOR SCREENING MAMMOGRAM FOR MALIGNANT NEOPLASM OF BREAST: ICD-10-CM

## 2022-12-13 PROCEDURE — 77067 SCR MAMMO BI INCL CAD: CPT | Performed by: FAMILY MEDICINE

## 2022-12-13 PROCEDURE — 77063 BREAST TOMOSYNTHESIS BI: CPT | Performed by: FAMILY MEDICINE

## 2023-02-16 ENCOUNTER — OFFICE VISIT (OUTPATIENT)
Dept: OBGYN CLINIC | Facility: CLINIC | Age: 58
End: 2023-02-16

## 2023-02-16 VITALS
WEIGHT: 155 LBS | DIASTOLIC BLOOD PRESSURE: 84 MMHG | HEIGHT: 58 IN | SYSTOLIC BLOOD PRESSURE: 133 MMHG | BODY MASS INDEX: 32.54 KG/M2 | HEART RATE: 64 BPM

## 2023-02-16 DIAGNOSIS — Z01.411 ENCOUNTER FOR GYNECOLOGICAL EXAMINATION WITH ABNORMAL FINDING: Primary | ICD-10-CM

## 2023-02-16 DIAGNOSIS — N94.10 DYSPAREUNIA IN FEMALE: ICD-10-CM

## 2023-02-16 PROCEDURE — 3075F SYST BP GE 130 - 139MM HG: CPT | Performed by: OBSTETRICS & GYNECOLOGY

## 2023-02-16 PROCEDURE — 3079F DIAST BP 80-89 MM HG: CPT | Performed by: OBSTETRICS & GYNECOLOGY

## 2023-02-16 PROCEDURE — 3008F BODY MASS INDEX DOCD: CPT | Performed by: OBSTETRICS & GYNECOLOGY

## 2023-02-16 PROCEDURE — 99396 PREV VISIT EST AGE 40-64: CPT | Performed by: OBSTETRICS & GYNECOLOGY

## 2023-02-16 RX ORDER — ESTRADIOL 0.1 MG/G
0.5 CREAM VAGINAL DAILY
Qty: 42.5 G | Refills: 1 | Status: SHIPPED | OUTPATIENT
Start: 2023-02-16

## 2023-02-17 LAB — HPV I/H RISK 1 DNA SPEC QL NAA+PROBE: NEGATIVE

## 2023-03-01 LAB — LAST PAP RESULT: NORMAL

## 2023-03-11 DIAGNOSIS — I10 ESSENTIAL HYPERTENSION: ICD-10-CM

## 2023-03-11 DIAGNOSIS — E78.00 HYPERCHOLESTEROLEMIA: ICD-10-CM

## 2023-03-12 RX ORDER — EZETIMIBE 10 MG/1
10 TABLET ORAL DAILY
Qty: 90 TABLET | Refills: 3 | Status: SHIPPED | OUTPATIENT
Start: 2023-03-12

## 2023-03-12 RX ORDER — LOSARTAN POTASSIUM 50 MG/1
50 TABLET ORAL DAILY
Qty: 90 TABLET | Refills: 3 | Status: SHIPPED | OUTPATIENT
Start: 2023-03-12

## 2023-03-12 NOTE — TELEPHONE ENCOUNTER
Refill passed per [employer] protocol. .  Requested Prescriptions   Pending Prescriptions Disp Refills    losartan 50 MG Oral Tab [Pharmacy Med Name: Losartan Potassium 50 Mg Tab [facility]] 90 tablet 3     Sig: Take 1 tablet (50 mg total) by mouth daily.        Hypertensive Medications Protocol Passed - 3/11/2023 11:13 AM        Passed - In person appointment in the past 12 or next 3 months     Recent Outpatient Visits              3 weeks ago Encounter for gynecological examination with abnormal finding    [address] [facility] Stanislav Crain Michael, [place] - OB/GYN Randy Palacios MD    Office Visit    4 months ago Encounter for annual health examination    [address] [facility] Michael, June Alberto MD    Office Visit    9 months ago [facility], [address], June Alberto MD    Telemedicine    11 months ago Acute frontal sinusitis, recurrence not specified    Demetriec Russell, [place]    Telemedicine    1 year ago Acute maxillary sinusitis, recurrence not specified    Demetrice Russell, [facility], [facility]    Office Visit                      Passed - Last BP reading less than 140/90     BP Readings from Last 1 Encounters:  02/16/23 : 133/84              Passed - CMP or BMP in past 6 months     Recent Results (from the past 4392 hour(s))   COMP METABOLIC PANEL (14)    Collection Time: 10/18/22 12:31 PM   Result Value Ref Range    Glucose 86 70 - 99 mg/dL    Sodium 140 136 - 145 mmol/L    Potassium 3.9 3.5 - 5.1 mmol/L    Chloride 108 98 - 112 mmol/L    CO2 27.0 21.0 - 32.0 mmol/L    Anion Gap 5 0 - 18 mmol/L    BUN 10 7 - 18 mg/dL    Creatinine 0.60 0.55 - 1.02 mg/dL    BUN/CREA Ratio 16.7 10.0 - 20.0    Calcium, Total 9.0 8.5 - 10.1 mg/dL    Calculated Osmolality 288 275 - 295 mOsm/kg    eGFR-Cr 105 >=60 mL/min/1.73m2    ALT 31 13 - 56 U/L    AST 20 15 - 37 U/L Alkaline Phosphatase 63 46 - 118 U/L    Bilirubin, Total 0.3 0.1 - 2.0 mg/dL    Total Protein 6.8 6.4 - 8.2 g/dL    Albumin 3.7 3.4 - 5.0 g/dL    Globulin  3.1 2.8 - 4.4 g/dL    A/G Ratio 1.2 1.0 - 2.0    Patient Fasting for CMP? Yes      *Note: Due to a large number of results and/or encounters for the requested time period, some results have not been displayed. A complete set of results can be found in Results Review. Passed - In person appointment or virtual visit in the past 6 months     Recent Outpatient Visits              3 weeks ago Encounter for gynecological examination with abnormal finding    [address] [facility] [address] Sangeeta Washington MD    Office Visit    4 months ago Encounter for annual health examination    [address], Abby Marie MD    Office Visit    9 months ago Cough    [address], Abby Marie MD    Telemedicine    11 months ago Acute frontal sinusitis, recurrence not specified    [address], GINGER Piper    Telemedicine    1 year ago Acute maxillary sinusitis, recurrence not specified    [address] [facility],[address], [place] [address], [address] [place] [address]    Office Visit                      Passed - EGFRCR or GFRNAA > 50     GFR Evaluation  EGFRCR: 105 , resulted on 10/18/2022            ezetimibe 10 MG Oral Tab [Pharmacy Med Name: Ezetimibe 10 Mg Tab [facility]] 90 tablet 3     Sig: Take 1 tablet (10 mg total) by mouth daily.        Cholesterol Medication Protocol Passed - 3/11/2023 11:13 AM        Passed - ALT in past 12 months        Passed - LDL in past 12 months        Passed - Last ALT < 80     Lab Results   Component Value Date    ALT 31 10/18/2022             Passed - Last LDL < 130     Lab Results   Component Value Date    LDL 96 10/18/2022             Passed - In person appointment or virtual visit in the past 12 mos or appointment in next 3 mos     Recent Outpatient Visits              3 weeks ago Encounter for gynecological examination with abnormal finding    [address] [facility] Brandyn Mcnulty MD    Office Visit    4 months ago Encounter for annual health examination    [address], Abel Baca MD    Office Visit    9 months ago [facility], [address], Abel Baca MD    Telemedicine    11 months ago Acute frontal sinusitis, recurrence not specified    [address], GINGER Jaimes    Telemedicine    1 year ago Acute maxillary sinusitis, recurrence not specified    Josiah Sun, [address], [address] [facility] Vanesa Escalera, [address]    Office Visit                           Recent Outpatient Visits              3 weeks ago Encounter for gynecological examination with abnormal finding    [address] [facility]Monisha MD    Office Visit    4 months ago Encounter for annual health examination    [address], Abel Baca MD    Office Visit    [facility] [address], Abel Baca MD    Telemedicine    11 months ago Acute frontal sinusitis, recurrence not specified    [address], GINGER Jaimes    Telemedicine    1 year ago Acute maxillary sinusitis, recurrence not specified    Demetrice Titus, [address] [facility] Vanesa Escalera, APRNAVEED    Office Visit

## 2023-03-28 ENCOUNTER — NURSE TRIAGE (OUTPATIENT)
Dept: FAMILY MEDICINE CLINIC | Facility: CLINIC | Age: 58
End: 2023-03-28

## 2023-03-28 ENCOUNTER — TELEMEDICINE (OUTPATIENT)
Dept: FAMILY MEDICINE CLINIC | Facility: CLINIC | Age: 58
End: 2023-03-28
Payer: COMMERCIAL

## 2023-03-28 DIAGNOSIS — H00.014 HORDEOLUM EXTERNUM LEFT UPPER EYELID: Primary | ICD-10-CM

## 2023-03-28 PROCEDURE — 99212 OFFICE O/P EST SF 10 MIN: CPT | Performed by: NURSE PRACTITIONER

## 2023-03-28 NOTE — ASSESSMENT & PLAN NOTE
Warm compresses  Discussed eye wash  Symptoms are ususally self limiting and clear with above treatment  Please call if symptoms worsen or are not resolving.

## 2023-04-07 ENCOUNTER — TELEPHONE (OUTPATIENT)
Dept: FAMILY MEDICINE CLINIC | Facility: CLINIC | Age: 58
End: 2023-04-07

## 2023-04-07 DIAGNOSIS — H00.019 HORDEOLUM EXTERNUM, UNSPECIFIED LATERALITY: Primary | ICD-10-CM

## 2023-04-07 RX ORDER — ERYTHROMYCIN 5 MG/G
1 OINTMENT OPHTHALMIC EVERY 6 HOURS
Qty: 1 G | Refills: 0 | Status: SHIPPED | OUTPATIENT
Start: 2023-04-07 | End: 2023-04-14

## 2023-04-07 NOTE — TELEPHONE ENCOUNTER
Erythromycin ophthalmic antibiotic ointment sent to pharmacy. She can try applying this to her water line, can dab a little bit over the stye. Avoid touching with dirty hands. Warm compresses with very clean towel or disposable cotton pad only. 1. Hordeolum externum, unspecified laterality    - erythromycin 5 MG/GM Ophthalmic Ointment; Apply 1 Application to eye every 6 (six) hours for 7 days.   Dispense: 1 g; Refill: 0

## 2023-04-07 NOTE — TELEPHONE ENCOUNTER
Pt had telemedicine visit with Ismael MILES 3/28/23 for hordeolum left upper eye. Did warm compresses, drained and issue resolved. Today left eyelid swelling returned worse with swelling, reddness. No drainage. Admitted to touching eyelid with fingers. May have infected area. Advised to continue with warm compresses and no touching eyelid with fingers. Will forward message to Ismael MILES to advise further. Pt will get a call back with APRN response.     Please reply to joi: STEPHANIE Gomes

## 2023-04-07 NOTE — TELEPHONE ENCOUNTER
Patient contacted and patient made aware of Dr. Tulio Salas interpretation and recommendations. Patient verbalized understanding. No further questions or concerns at this time.

## 2023-04-07 NOTE — TELEPHONE ENCOUNTER
Patient called stating she has not heard back related to message below. Denies any fever. She states that there is pain 3-5/10. She is wondering if any Abx will help. She would like a call back asap. I made her aware I will send urgent message to Dr. Amanda Gutierrez. Patient verbalized understanding. No further questions or concerns at this time.     Dr. Amanda Gutierrez to advise

## 2023-04-17 ENCOUNTER — TELEPHONE (OUTPATIENT)
Dept: OPHTHALMOLOGY | Facility: CLINIC | Age: 58
End: 2023-04-17

## 2023-04-17 ENCOUNTER — TELEPHONE (OUTPATIENT)
Dept: FAMILY MEDICINE CLINIC | Facility: CLINIC | Age: 58
End: 2023-04-17

## 2023-04-17 NOTE — TELEPHONE ENCOUNTER
On call:    Patient paged  4/16 at 11.17 am  with c/o eyes feels reddish, crusty and swollen    Has been dealing with this issue since  3 weeks  Had a televisit  3/28   Diagnosed with stye left eye    Patient was recommended to do warm compresses and to follow-up if no better. Patient called back on April 7 stating symptoms not improved and was recommended to see ophthalmology and prescribed doxycycline 1 tablet twice daily for 7 days. She was recommended to hold off on the ointment that she had been using. She feels now that symptoms have moved to her other eye. She does have an appointment to see ophthalmology tomorrow. She is also been doing warm compresses. Does not feel any pain in the eyes headaches or fevers. Would recommend to keep appointment with ophthalmology tomorrow and avoid rubbing or any eye make-up.     Hardik Dallas

## 2023-04-17 NOTE — TELEPHONE ENCOUNTER
Spoke to patient. Pt has a stye on SHAWN x 3 weeks. Pt states she has been doing warm compresses and then feels that it drained. But now she has one on LLL. Pt states that she now RUL. Pt is concerned because she is getting multiple styes. Pt has used antibiotic drops and oral antibiotic per her primary care doctor. Advised patient that doctor Giana Escobedo normally would just have patient do aggressive warm compresses and would not put patient on ane eye drop or oral antibiotic. Pt could try to do lid hygiene to help prevent styes from occurring. Patient was instructed to use warm compresses to the eyelids twice a day everyday. Instructions for warm compress use:   Patient should place wash compresses on both eyelids for 5 minutes every morning and every night. After 5 minutes of holding the warm compresses on the eyelids, patient should gently rub the eyelashes and then rinse thoroughly with warm water. She can also try to use baby shampoo or ocusoft scrubs. Offered patient an appointment for Dr Giana Escobedo. Pt will wait and call back if she needs an appointment.

## 2023-04-17 NOTE — TELEPHONE ENCOUNTER
Pt has stye for 2 weeks - was told to see Dr - under eye is filling up with water - possibly moving to other eye now

## 2023-11-02 ENCOUNTER — OFFICE VISIT (OUTPATIENT)
Dept: FAMILY MEDICINE CLINIC | Facility: CLINIC | Age: 58
End: 2023-11-02
Payer: COMMERCIAL

## 2023-11-02 VITALS
HEIGHT: 58.07 IN | WEIGHT: 150 LBS | DIASTOLIC BLOOD PRESSURE: 94 MMHG | SYSTOLIC BLOOD PRESSURE: 142 MMHG | BODY MASS INDEX: 31.49 KG/M2

## 2023-11-02 DIAGNOSIS — Z00.00 ENCOUNTER FOR ANNUAL HEALTH EXAMINATION: Primary | ICD-10-CM

## 2023-11-02 DIAGNOSIS — Z12.31 ENCOUNTER FOR SCREENING MAMMOGRAM FOR MALIGNANT NEOPLASM OF BREAST: ICD-10-CM

## 2023-11-02 DIAGNOSIS — I10 ESSENTIAL HYPERTENSION: ICD-10-CM

## 2023-11-02 PROCEDURE — 3077F SYST BP >= 140 MM HG: CPT | Performed by: FAMILY MEDICINE

## 2023-11-02 PROCEDURE — 3080F DIAST BP >= 90 MM HG: CPT | Performed by: FAMILY MEDICINE

## 2023-11-02 PROCEDURE — 99396 PREV VISIT EST AGE 40-64: CPT | Performed by: FAMILY MEDICINE

## 2023-11-02 PROCEDURE — 3008F BODY MASS INDEX DOCD: CPT | Performed by: FAMILY MEDICINE

## 2023-11-02 RX ORDER — TELMISARTAN 40 MG/1
40 TABLET ORAL DAILY
Qty: 90 TABLET | Refills: 3 | Status: SHIPPED | OUTPATIENT
Start: 2023-11-02

## 2023-11-02 RX ORDER — HYDROCHLOROTHIAZIDE 12.5 MG/1
12.5 TABLET ORAL DAILY
Qty: 90 TABLET | Refills: 3 | Status: SHIPPED | OUTPATIENT
Start: 2023-11-02 | End: 2024-10-27

## 2023-11-29 ENCOUNTER — LAB ENCOUNTER (OUTPATIENT)
Dept: LAB | Age: 58
End: 2023-11-29
Attending: FAMILY MEDICINE
Payer: COMMERCIAL

## 2023-11-29 DIAGNOSIS — Z00.00 ENCOUNTER FOR ANNUAL HEALTH EXAMINATION: ICD-10-CM

## 2023-11-29 LAB
EST. AVERAGE GLUCOSE BLD GHB EST-MCNC: 117 MG/DL (ref 68–126)
HBA1C MFR BLD: 5.7 % (ref ?–5.7)
TSI SER-ACNC: 2.07 MIU/ML (ref 0.55–4.78)

## 2023-11-29 PROCEDURE — 36415 COLL VENOUS BLD VENIPUNCTURE: CPT

## 2023-11-29 PROCEDURE — 84443 ASSAY THYROID STIM HORMONE: CPT

## 2023-11-29 PROCEDURE — 83036 HEMOGLOBIN GLYCOSYLATED A1C: CPT

## 2024-01-04 ENCOUNTER — PATIENT MESSAGE (OUTPATIENT)
Dept: FAMILY MEDICINE CLINIC | Facility: CLINIC | Age: 59
End: 2024-01-04

## 2024-01-05 NOTE — TELEPHONE ENCOUNTER
Provider responded to patient and patient acknowledged message. No further action needed at this time.     Closing this encounter.

## 2024-01-05 NOTE — TELEPHONE ENCOUNTER
From: Mckenzie Winchester  To: Fara Mcclure  Sent: 1/4/2024 2:38 PM CST  Subject: blood pressure    Hello Doctor Ren,  You told me to touch base on my results for my blood pressure since I started the new blood pressure medicine.     It's [id number] for a few weeks now. I cannot get that   bottom number down for the life of me.    Just wanted you to know and if I should be doing an  anything more.    Much Thanks,  Mckenzie Bee

## 2024-01-09 ENCOUNTER — NURSE TRIAGE (OUTPATIENT)
Dept: FAMILY MEDICINE CLINIC | Facility: CLINIC | Age: 59
End: 2024-01-09

## 2024-01-09 ENCOUNTER — OFFICE VISIT (OUTPATIENT)
Dept: FAMILY MEDICINE CLINIC | Facility: CLINIC | Age: 59
End: 2024-01-09

## 2024-01-09 VITALS
HEART RATE: 64 BPM | BODY MASS INDEX: 31.49 KG/M2 | DIASTOLIC BLOOD PRESSURE: 80 MMHG | WEIGHT: 150 LBS | SYSTOLIC BLOOD PRESSURE: 136 MMHG | HEIGHT: 58 IN

## 2024-01-09 DIAGNOSIS — H93.291 ABNORMAL AUDITORY PERCEPTION OF RIGHT EAR: ICD-10-CM

## 2024-01-09 DIAGNOSIS — H61.21 CERUMEN DEBRIS ON TYMPANIC MEMBRANE OF RIGHT EAR: ICD-10-CM

## 2024-01-09 DIAGNOSIS — Z12.31 BREAST CANCER SCREENING BY MAMMOGRAM: Primary | ICD-10-CM

## 2024-01-09 PROBLEM — H92.01 RIGHT EAR PAIN: Status: ACTIVE | Noted: 2024-01-09

## 2024-01-09 PROCEDURE — 3008F BODY MASS INDEX DOCD: CPT

## 2024-01-09 PROCEDURE — 3075F SYST BP GE 130 - 139MM HG: CPT

## 2024-01-09 PROCEDURE — 3079F DIAST BP 80-89 MM HG: CPT

## 2024-01-09 PROCEDURE — 99213 OFFICE O/P EST LOW 20 MIN: CPT

## 2024-01-09 PROCEDURE — 69210 REMOVE IMPACTED EAR WAX UNI: CPT

## 2024-01-09 NOTE — ASSESSMENT & PLAN NOTE
Patient reports hearing a cracking sensation in the ear on and off x 2 weeks.  Denies pain.  On assessment patient with dead skin cells attached to tympanic membrane.  Likely vibrating tympanic membrane.  Attempted to irrigate with hydrogen peroxide and warm water and using microcurette.  Unable to dislodge dead skin.  Educated patient on Debrox  Patient to purchase Debrox and utilize at home will attempt home irrigation if unable to will come back to office for reattempt irrigation.

## 2024-01-09 NOTE — ASSESSMENT & PLAN NOTE
See above   Debrox  Follow-up as needed  Patient aware of plan of care. All questions answered to satisfaction of the patient. Patient instructed to call office or reach out via clipkitt if any issues arise. For urgent issues and/or reviewed red flags please proceed to the urgent care or ER.  Also, inform the nurse practitioner with any new symptoms or medication side effects.

## 2024-01-09 NOTE — PATIENT INSTRUCTIONS
Excessive earwax or cerumen buildup is a common problem that can lead to ear discomfort and loss of hearing.  Purchase Debrox over-the-counter your from local pharmacy.  Tilt your head sideways, place 5 to 10 drops in your ear, keep the drops in your ears for several minutes by keeping head tilted or by placing cotton in your ear to prevent it from coming out. Use twice daily for at least 4 days. At the end of the 4 days flush the ear with warm water using a soft rubber bulb syringe, this should be part of the overall package purchased. If wax remains an issue after this please let me know cerumen disimpaction in office or refer to ear nose and throat doctor.

## 2024-01-09 NOTE — TELEPHONE ENCOUNTER
Action Requested: Summary for Provider     []  Critical Lab, Recommendations Needed  [] Need Additional Advice  [x]   FYI    []   Need Orders  [] Need Medications Sent to Pharmacy  []  Other     SUMMARY: R ear congestion with crackling present. Same day office visit offered --> agreeable and scheduled. Refer to system/assessment protocol for yes/no answers. [See below for more details]    Reason for call: Ear Problem  Onset: 2 weeks ago    Reason for Disposition   Ear congestion present > 48 hours    Protocols used: Ear - Congestion-A-OH      Patient called states she has some crackling of her R ear. She denies any pain. She reports ear is muffled. Denies any fever or drainage. Denies any nasal congestion. Same day office visit offered --> agreeable and scheduled.  Home Care Advice discussed, per protocol. Patient instructed any new or worsening symptoms [reviewed] seek immediate medical attention. Patient verbalized understanding. No further questions or concerns at this time.    Future Appointments   Date Time Provider Department Center   1/9/2024 10:20 AM Aurelio Mcpherson APRN [facility]   1/22/2024 11:40 AM MARILIN HOLLIS 1 MARILIN Renee

## 2024-01-09 NOTE — PROGRESS NOTES
Subjective:   Mckenzie Winchester is a 58 year old female who presents for Ear Pain (Pt experiencing cracking in ear for 2 weeks on and off. )   Patient is a pleasant 58-year-old female with past medical history significant for anxiety, breast cyst, gastritis, depression.  Patient presents to office today for right ear congestion.  Patient states right ear cracking on and off x 2 weeks. No pain. Has not tried anything at home from it.           Past Medical History:   Diagnosis Date    Abnormal mammogram     Abnormal mammogram     aspiration  of breast cyst     ACL tear      R knee arthroscopy    Anxiety state, unspecified     drug therapy    Breast cyst 2008 abnormal mammogram, cyst aspiration via u/s    Breast cyst 2006    abnormal mammogram, aspiration of breast cyst    Depression     drug therapy    Esophagitis     Gastritis      EGD with biopsy    Irregular menstrual cycle 2012 neg endometrial biopsy    Irregular menstrual cycle 2008 neg endometrial biopsy    Rectal bleed     chronic intermittent rectal bleed, never did colonoscopy      Past Surgical History:   Procedure Laterality Date    Biopsy of uterus lining      neg endometrial biopsy, irregular menstrual cycle    Biopsy of uterus lining      neg endometrial biopsy, irregular menstrual cycle                Colonoscopy  2010    Cyst aspiration right      Knee arthroscopy Right 1998    R knee arthroscopy    Mammogram, both breasts  2008    abnormal mammogram, breast cyst    Mammogram, both breasts  2006    abnormal mammogram, aspiration of breast cyst    Punc/aspir breast cyst  2006    aspiraton of breast cyst [APPROACH NOT STATED], abnormal mammogram    Upper gi endoscopy,biopsy      EGD with biopsy    Us breast cyst aspiration (cpt=76942/71654)  2008    cyst aspiration via u/s, abnormal mammogram        History/Other:    Chief Complaint Reviewed and Verified   Nursing Notes Reviewed and   Verified  Tobacco Reviewed  Allergies Reviewed  Medications Reviewed    Problem List Reviewed  Medical History Reviewed  Surgical History   Reviewed  OB Status Reviewed  Family History Reviewed         Tobacco:  She has never smoked tobacco.    Current Outpatient Medications   Medication Sig Dispense Refill    telmisartan 40 MG Oral Tab Take 1 tablet (40 mg total) by mouth daily. 90 tablet 3    hydroCHLOROthiazide 12.5 MG Oral Tab Take 1 tablet (12.5 mg total) by mouth daily. 90 tablet 3    levothyroxine 50 MCG Oral Tab Take 1 tablet (50 mcg total) by mouth daily. 90 tablet 3    ezetimibe 10 MG Oral Tab Take 1 tablet (10 mg total) by mouth daily. 90 tablet 3    sertraline 100 MG Oral Tab Take 1 tablet (100 mg total) by mouth every morning.      ClonazePAM 0.5 MG Oral Tab   4    TraZODone HCl (DESYREL) 50 MG Oral Tab 1 tablet (50 mg total) nightly.      estradiol 0.1 MG/GM Vaginal Cream Place 0.5 g vaginally daily. Use nightly for 2 weeks then every Monday / Thursday night (Patient not taking: Reported on 11/2/2023) 42.5 g 1         Review of Systems:  Review of Systems   Constitutional:  Negative for chills and fever.   HENT:  Positive for ear pain. Negative for congestion.    Eyes: Negative.    Respiratory: Negative.     Cardiovascular: Negative.    Endocrine: Negative.    Genitourinary: Negative.    Allergic/Immunologic: Negative.    Neurological: Negative.    Hematological: Negative.          Objective:   /80   Pulse 64   Ht 4' 10\" (1.473 m)   Wt 150 lb (68 kg)   BMI 31.35 kg/m²  Estimated body mass index is 31.35 kg/m² as calculated from the following:    Height as of this encounter: 4' 10\" (1.473 m).    Weight as of this encounter: 150 lb (68 kg).  Physical Exam  Vitals and nursing note reviewed.   Constitutional:       Appearance: Normal appearance. She is normal weight.   HENT:      Right Ear: Ear canal and external ear normal.      Left Ear: Tympanic membrane  normal.      Ears:      Levin exam findings: Does not lateralize.     Comments: Cerumen debris on right ear     Nose: Nose normal.      Mouth/Throat:      Mouth: Mucous membranes are moist.      Pharynx: Oropharynx is clear.   Cardiovascular:      Rate and Rhythm: Normal rate and regular rhythm.      Pulses: Normal pulses.      Heart sounds: Normal heart sounds.   Pulmonary:      Effort: Pulmonary effort is normal.      Breath sounds: Normal breath sounds.   Abdominal:      General: Abdomen is flat.      Palpations: Abdomen is soft.   Musculoskeletal:         General: Normal range of motion.      Cervical back: Normal range of motion.   Skin:     General: Skin is warm and dry.      Capillary Refill: Capillary refill takes less than 2 seconds.   Neurological:      Mental Status: She is alert and oriented to person, place, and time.           Assessment & Plan:   1. Breast cancer screening by mammogram (Primary)  Assessment & Plan:   Patient due for Mammogram.  Referral placed.   Orders:  -     [facility] KENNA 2D+3D SCREENING BILAT (CPT=77067/25940); Future; Expected date: 01/09/2024  2. Abnormal auditory perception of right ear  Assessment & Plan:  Patient reports hearing a cracking sensation in the ear on and off x 2 weeks.  Denies pain.  On assessment patient with dead skin cells attached to tympanic membrane.  Likely vibrating tympanic membrane.  Attempted to irrigate with hydrogen peroxide and warm water and using microcurette.  Unable to dislodge dead skin.  Educated patient on Debrox  Patient to purchase Debrox and utilize at home will attempt home irrigation if unable to will come back to office for reattempt irrigation.  3. Cerumen debris on tympanic membrane of right ear  Assessment & Plan:  See above   Debrox  Follow-up as needed  Patient aware of plan of care. All questions answered to satisfaction of the patient. Patient instructed to call office or reach out via Brancht if any issues arise. For urgent issues  and/or reviewed red flags please proceed to the urgent care or ER.  Also, inform the nurse practitioner with any new symptoms or medication side effects.              Return if symptoms worsen or fail to improve.    Aurelio Mcpherson, APRN, 1/9/2024, 10:13 AM

## 2024-01-09 NOTE — PROCEDURES
Patient with cerumen debris on right ear.  Irrigated with warm water hydrogen peroxide mixture unable to remove with microcurette.  Tympanic membrane clear and intact.  Pt educated on debrox, will follow up after debrox at home.

## 2024-01-22 ENCOUNTER — HOSPITAL ENCOUNTER (OUTPATIENT)
Dept: MAMMOGRAPHY | Age: 59
Discharge: HOME | End: 2024-01-22
Attending: FAMILY MEDICINE
Payer: COMMERCIAL

## 2024-01-22 DIAGNOSIS — Z12.31 ENCOUNTER FOR SCREENING MAMMOGRAM FOR MALIGNANT NEOPLASM OF BREAST: ICD-10-CM

## 2024-01-22 PROCEDURE — 77067 SCR MAMMO BI INCL CAD: CPT | Performed by: FAMILY MEDICINE

## 2024-01-22 PROCEDURE — 77063 BREAST TOMOSYNTHESIS BI: CPT | Performed by: FAMILY MEDICINE

## 2024-03-16 DIAGNOSIS — E78.00 HYPERCHOLESTEROLEMIA: ICD-10-CM

## 2024-03-19 RX ORDER — EZETIMIBE 10 MG/1
10 TABLET ORAL DAILY
Qty: 90 TABLET | Refills: 0 | Status: SHIPPED | OUTPATIENT
Start: 2024-03-19

## 2024-03-19 NOTE — TELEPHONE ENCOUNTER
Please review.  Protocol failed / Has no protocol.     No Active/ Future labs pended    Requested Prescriptions   Pending Prescriptions Disp Refills    EZETIMIBE 10 MG Oral Tab [Pharmacy Med Name: Ezetimibe 10 Mg Tab [facility]] 90 tablet 0     Sig: TAKE ONE TABLET BY MOUTH ONE TIME DAILY       Cholesterol Medication Protocol Failed - 3/16/2024  9:46 AM        Failed - ALT < 80     Lab Results   Component Value Date    ALT 31 10/18/2022             Failed - ALT resulted within past year        Failed - Lipid panel within past 12 months     Lab Results   Component Value Date    CHOLEST 176 10/18/2022    TRIG 73 10/18/2022    HDL 66 (H) 10/18/2022    LDL 96 10/18/2022    VLDL 12 10/18/2022    NONHDLC 110 10/18/2022             Passed - In person appointment or virtual visit in the past 12 mos or appointment in next 3 mos     Recent Outpatient Visits              2 months ago Breast cancer screening by mammogram    [facility] [place] Aurelio Mcpherson APRN    Office Visit    4 months ago Encounter for annual health examination    [facility], Fara Cartagena MD    Office Visit    11 months ago Hordeolum externum left upper eyelid    [facility] VíctorKate Braxton APRN    Telemedicine    1 year ago Encounter for gynecological examination with abnormal finding    [facility] [place] - OB/GYN Lisa Kendall MD    Office Visit    1 year ago Encounter for annual health examination    [facility], Fara Cartagena MD    Office Visit

## 2024-06-05 ENCOUNTER — OFFICE VISIT (OUTPATIENT)
Dept: FAMILY MEDICINE CLINIC | Facility: CLINIC | Age: 59
End: 2024-06-05

## 2024-06-05 VITALS
BODY MASS INDEX: 31.77 KG/M2 | SYSTOLIC BLOOD PRESSURE: 130 MMHG | WEIGHT: 151.38 LBS | DIASTOLIC BLOOD PRESSURE: 70 MMHG | HEIGHT: 58 IN | HEART RATE: 65 BPM

## 2024-06-05 DIAGNOSIS — I10 ESSENTIAL HYPERTENSION: ICD-10-CM

## 2024-06-05 DIAGNOSIS — H92.01 RIGHT EAR PAIN: Primary | ICD-10-CM

## 2024-06-05 PROCEDURE — 3008F BODY MASS INDEX DOCD: CPT | Performed by: FAMILY MEDICINE

## 2024-06-05 PROCEDURE — 3078F DIAST BP <80 MM HG: CPT | Performed by: FAMILY MEDICINE

## 2024-06-05 PROCEDURE — 3075F SYST BP GE 130 - 139MM HG: CPT | Performed by: FAMILY MEDICINE

## 2024-06-05 PROCEDURE — 99213 OFFICE O/P EST LOW 20 MIN: CPT | Performed by: FAMILY MEDICINE

## 2024-06-05 RX ORDER — TRIAMCINOLONE ACETONIDE 1 MG/G
CREAM TOPICAL 3 TIMES DAILY
Qty: 15 G | Refills: 0 | Status: SHIPPED | OUTPATIENT
Start: 2024-06-05

## 2024-06-05 NOTE — PROGRESS NOTES
Mckenzie Winchester is a 59 year old female.   Chief Complaint   Patient presents with    Ear Problem     1 week x right ear     HPI:   Reports issues in right ear. Feels like something moving in her ear.   Brother diagnosed with lupus.   Been walking more.   Current Outpatient Medications on File Prior to Visit   Medication Sig Dispense Refill    ezetimibe 10 MG Oral Tab Take 1 tablet (10 mg total) by mouth daily. 90 tablet 0    telmisartan 40 MG Oral Tab Take 1 tablet (40 mg total) by mouth daily. 90 tablet 3    hydroCHLOROthiazide 12.5 MG Oral Tab Take 1 tablet (12.5 mg total) by mouth daily. 90 tablet 3    levothyroxine 50 MCG Oral Tab Take 1 tablet (50 mcg total) by mouth daily. 90 tablet 3    sertraline 100 MG Oral Tab Take 1 tablet (100 mg total) by mouth every morning.      ClonazePAM 0.5 MG Oral Tab   4    TraZODone HCl (DESYREL) 50 MG Oral Tab 1 tablet (50 mg total) nightly.      estradiol 0.1 MG/GM Vaginal Cream Place 0.5 g vaginally daily. Use nightly for 2 weeks then every Monday / Thursday night (Patient not taking: Reported on 11/2/2023) 42.5 g 1     No current facility-administered medications on file prior to visit.      Past Medical History:    Abnormal mammogram    Abnormal mammogram    aspiration  of breast cyst     ACL tear    1998 R knee arthroscopy    Anxiety state, unspecified    drug therapy    Breast cyst    2008 abnormal mammogram, cyst aspiration via u/s    Breast cyst    abnormal mammogram, aspiration of breast cyst    Depression    drug therapy    Esophagitis    Gastritis    2010 EGD with biopsy    Irregular menstrual cycle    2012 neg endometrial biopsy    Irregular menstrual cycle    2008 neg endometrial biopsy    Rectal bleed    chronic intermittent rectal bleed, never did colonoscopy      Social History:  Social History     Socioeconomic History    Marital status:    Tobacco Use    Smoking status: Never    Smokeless tobacco: Never   Vaping Use    Vaping status: Never Used    Substance and Sexual Activity    Alcohol use: Yes     Comment: 2-3 times per month.    Drug use: No   Other Topics Concern    Caffeine Concern Yes     Comment: coffee, 2 cups daily    Exercise No        REVIEW OF SYSTEMS:   Review of Systems   See HPI     EXAM:   /85   Pulse 65   Ht 4' 10\" (1.473 m)   Wt 151 lb 6.4 oz (68.7 kg)   BMI 31.64 kg/m²   /70   Pulse 65   Ht 4' 10\" (1.473 m)   Wt 151 lb 6.4 oz (68.7 kg)   BMI 31.64 kg/m²     GENERAL: well developed, well nourished,in no apparent distress  SKIN: no rashes,no suspicious lesions  HEENT: left TM normal. Right TM mild effusion. Mild erythema in canal     ASSESSMENT AND PLAN:   1. Right ear pain  Reassurance. Mild erythema- triamcinolone 2-3 times a day     2. Essential hypertension  Keep walking.       The patient indicates understanding of these issues and agrees to the plan.      Francesca Mac MD  6/5/2024  10:49 AM

## 2024-06-17 DIAGNOSIS — E78.00 HYPERCHOLESTEROLEMIA: ICD-10-CM

## 2024-06-19 NOTE — TELEPHONE ENCOUNTER
Please review.  Protocol failed / Has no protocol.    No Active/ Future labs pended     Requested Prescriptions   Pending Prescriptions Disp Refills    EZETIMIBE 10 MG Oral Tab [Pharmacy Med Name: Ezetimibe 10 Mg Tab Nort] 90 tablet 0     Sig: TAKE ONE TABLET BY MOUTH DAILY       Cholesterol Medication Protocol Failed - 6/17/2024 10:23 AM        Failed - ALT < 80     Lab Results   Component Value Date    ALT 31 10/18/2022             Failed - ALT resulted within past year        Failed - Lipid panel within past 12 months     Lab Results   Component Value Date    CHOLEST 176 10/18/2022    TRIG 73 10/18/2022    HDL 66 (H) 10/18/2022    LDL 96 10/18/2022    VLDL 12 10/18/2022    NONHDLC 110 10/18/2022             Passed - In person appointment or virtual visit in the past 12 mos or appointment in next 3 mos     Recent Outpatient Visits              2 weeks ago Right ear pain    [facility], Francesca Ewing MD    Office Visit    5 months ago Breast cancer screening by mammogram    [facility], Aurelio Figueroa APRN    Office Visit    7 months ago Encounter for annual health examination    [facility], Fara Cartagena MD    Office Visit    1 year ago Hordeolum externum left upper eyelid    [facility], Kate Garcia APRN    Telemedicine    1 year ago Encounter for gynecological examination with abnormal finding    [facility] - OB/GYN Lisa Kendall MD    Office Visit                           Recent Outpatient Visits              2 weeks ago Right ear pain    [facility], Francesca Ewing MD    Office Visit    5 months ago Breast cancer screening by mammogram    [facility], Aurelio Figueroa APRN    Office Visit    7 months ago Encounter for  annual health examination    [facility], Fara Cartagena MD    Office Visit    1 year ago Hordeolum externum left upper eyelid    [facility], Kate Garcia APRN    Telemedicine    1 year ago [facility] for gynecological examination with abnormal finding    [facility]    Office Visit

## 2024-06-20 RX ORDER — EZETIMIBE 10 MG/1
10 TABLET ORAL DAILY
Qty: 90 TABLET | Refills: 3 | Status: SHIPPED | OUTPATIENT
Start: 2024-06-20

## 2024-07-11 DIAGNOSIS — E03.9 ACQUIRED HYPOTHYROIDISM: ICD-10-CM

## 2024-07-15 RX ORDER — LEVOTHYROXINE SODIUM 0.05 MG/1
50 TABLET ORAL DAILY
Qty: 90 TABLET | Refills: 1 | Status: SHIPPED | OUTPATIENT
Start: 2024-07-15

## 2024-07-15 NOTE — TELEPHONE ENCOUNTER
Refill passed per [facility] protocol.    Due for physical in November    Requested Prescriptions   Pending Prescriptions Disp Refills    LEVOTHYROXINE 50 MCG Oral Tab [Pharmacy Med Name: Levothyroxine Sodium 50 Mcg Tab Lupi] 90 tablet 0     Sig: TAKE ONE TABLET BY MOUTH ONE TIME DAILY       Thyroid Medication Protocol Passed - 7/11/2024  5:48 AM        Passed - TSH in past 12 months        Passed - Last TSH value is normal     Lab Results   Component Value Date    TSH 2.072 11/29/2023                 Passed - In person appointment or virtual visit in the past 12 mos or appointment in next 3 mos     Recent Outpatient Visits              1 month ago Right ear pain    [facility], Francesca Ewing MD    Office Visit    6 months ago Breast cancer screening by mammogram    [facility], Aurelio Figueroa APRN    Office Visit    8 months ago Encounter for annual health examination    [facility], Fara Cartagena MD    Office Visit    1 year ago Hordeolum externum left upper eyelid    [facility], Kate Garcia APRN    Telemedicine    1 year ago Encounter for gynecological examination with abnormal finding    [facility] - OB/GYN Lisa Kendall MD    Office Visit                         Recent Outpatient Visits              1 month ago Right ear pain    [facility], Francesca Ewing MD    Office Visit    6 months ago Breast cancer screening by mammogram    [facility], Aurelio Figueroa APRN    Office Visit    8 months ago Encounter for annual health examination    [facility], Fara Cartagena MD    Office Visit    1 year ago Hordeolum externum left upper eyelid    [facility]  [place] Karen A, GINGER    Telemedicine    1 year ago Encounter for gynecological examination with abnormal finding    [facility] [address], [place] - OB/GYN Lisa Kendall MD    Office Visit

## 2024-08-08 ENCOUNTER — NURSE TRIAGE (OUTPATIENT)
Dept: FAMILY MEDICINE CLINIC | Facility: CLINIC | Age: 59
End: 2024-08-08

## 2024-08-08 ENCOUNTER — OFFICE VISIT (OUTPATIENT)
Dept: FAMILY MEDICINE CLINIC | Facility: CLINIC | Age: 59
End: 2024-08-08

## 2024-08-08 VITALS
BODY MASS INDEX: 33 KG/M2 | DIASTOLIC BLOOD PRESSURE: 86 MMHG | HEART RATE: 75 BPM | SYSTOLIC BLOOD PRESSURE: 130 MMHG | WEIGHT: 156 LBS | OXYGEN SATURATION: 100 %

## 2024-08-08 DIAGNOSIS — R03.0 ELEVATED BLOOD PRESSURE READING: ICD-10-CM

## 2024-08-08 DIAGNOSIS — J98.8 RESPIRATORY INFECTION: Primary | ICD-10-CM

## 2024-08-08 DIAGNOSIS — H00.013 HORDEOLUM EXTERNUM OF RIGHT EYE, UNSPECIFIED EYELID: ICD-10-CM

## 2024-08-08 PROCEDURE — 99214 OFFICE O/P EST MOD 30 MIN: CPT | Performed by: FAMILY MEDICINE

## 2024-08-08 PROCEDURE — 3075F SYST BP GE 130 - 139MM HG: CPT | Performed by: FAMILY MEDICINE

## 2024-08-08 PROCEDURE — 3079F DIAST BP 80-89 MM HG: CPT | Performed by: FAMILY MEDICINE

## 2024-08-08 RX ORDER — CODEINE PHOSPHATE AND GUAIFENESIN 10; 100 MG/5ML; MG/5ML
5 SOLUTION ORAL EVERY 6 HOURS PRN
Qty: 100 ML | Refills: 0 | Status: SHIPPED | OUTPATIENT
Start: 2024-08-08

## 2024-08-08 RX ORDER — AZITHROMYCIN 250 MG/1
TABLET, FILM COATED ORAL
Qty: 6 TABLET | Refills: 0 | Status: SHIPPED | OUTPATIENT
Start: 2024-08-08 | End: 2024-08-12

## 2024-08-08 NOTE — TELEPHONE ENCOUNTER
Action Requested: Summary for Provider     []  Critical Lab, Recommendations Needed  [] Need Additional Advice  [x]   FYI    []   Need Orders  [] Need Medications Sent to Pharmacy  []  Other     SUMMARY: Appointment today    Reason for call: Cold  Onset: Friday     Patient calling office, transferred to Nurse Triage from    Right eye - several styes - but now only 1 at this time. Styes improving with warm compress    Has been using over-the-counter Advil, mucinex.   No improvement.     Sore throat. No known exposure to strep, RSV COVID.   No Gastroenterology symptoms.   Triaged and advised care advice   No fever  Evaluation advised today, Appointment made.    Instructed to enter the building wearing a well-fitted mask and keep the mask on for their entire visit.   Instructed to practice Social distancing and hand hygiene while at the office.         Future Appointments   Date Time Provider Department Center   8/8/2024  1:45 PM Fraa Mcclure MD [facility]   10/3/2024 11:30 AM Fara Mcclrue MD [facility]       Reason for Disposition   Sore throat present > 5 days    Protocols used: Common Cold-A-[place]

## 2024-10-07 ENCOUNTER — MED REC SCAN ONLY (OUTPATIENT)
Dept: FAMILY MEDICINE CLINIC | Facility: CLINIC | Age: 59
End: 2024-10-07

## 2024-10-07 ENCOUNTER — LAB ENCOUNTER (OUTPATIENT)
Dept: LAB | Age: 59
End: 2024-10-07
Attending: FAMILY MEDICINE
Payer: COMMERCIAL

## 2024-10-07 ENCOUNTER — OFFICE VISIT (OUTPATIENT)
Dept: FAMILY MEDICINE CLINIC | Facility: CLINIC | Age: 59
End: 2024-10-07

## 2024-10-07 VITALS
HEART RATE: 70 BPM | WEIGHT: 154 LBS | BODY MASS INDEX: 32 KG/M2 | DIASTOLIC BLOOD PRESSURE: 84 MMHG | SYSTOLIC BLOOD PRESSURE: 153 MMHG

## 2024-10-07 DIAGNOSIS — E78.00 HYPERCHOLESTEROLEMIA: ICD-10-CM

## 2024-10-07 DIAGNOSIS — Z00.00 ENCOUNTER FOR ANNUAL HEALTH EXAMINATION: ICD-10-CM

## 2024-10-07 DIAGNOSIS — Z00.00 ENCOUNTER FOR ANNUAL HEALTH EXAMINATION: Primary | ICD-10-CM

## 2024-10-07 LAB
EST. AVERAGE GLUCOSE BLD GHB EST-MCNC: 117 MG/DL (ref 68–126)
HBA1C MFR BLD: 5.7 % (ref ?–5.7)
TSI SER-ACNC: 2.17 MIU/ML (ref 0.55–4.78)

## 2024-10-07 PROCEDURE — 83036 HEMOGLOBIN GLYCOSYLATED A1C: CPT

## 2024-10-07 PROCEDURE — 84443 ASSAY THYROID STIM HORMONE: CPT

## 2024-10-07 PROCEDURE — 36415 COLL VENOUS BLD VENIPUNCTURE: CPT

## 2024-10-07 RX ORDER — ATORVASTATIN CALCIUM 10 MG/1
10 TABLET, FILM COATED ORAL NIGHTLY
Qty: 90 TABLET | Refills: 3 | Status: SHIPPED | OUTPATIENT
Start: 2024-10-07

## 2024-10-07 NOTE — PROGRESS NOTES
HPI:   Mckenzie Winchester is a 59 year old female who presents for a complete physical exam.        Wt Readings from Last 3 Encounters:   10/07/24 154 lb (69.9 kg)   08/08/24 156 lb (70.8 kg)   06/05/24 151 lb 6.4 oz (68.7 kg)     Body mass index is 32.19 kg/m².       Current Outpatient Medications   Medication Sig Dispense Refill    atorvastatin 10 MG Oral Tab Take 1 tablet (10 mg total) by mouth nightly. 90 tablet 3    levothyroxine 50 MCG Oral Tab Take 1 tablet (50 mcg total) by mouth daily. 90 tablet 1    ezetimibe 10 MG Oral Tab Take 1 tablet (10 mg total) by mouth daily. 90 tablet 3    triamcinolone 0.1 % External Cream Apply topically 3 (three) times daily. 15 g 0    telmisartan 40 MG Oral Tab Take 1 tablet (40 mg total) by mouth daily. 90 tablet 3    hydroCHLOROthiazide 12.5 MG Oral Tab Take 1 tablet (12.5 mg total) by mouth daily. 90 tablet 3    sertraline 100 MG Oral Tab Take 1 tablet (100 mg total) by mouth every morning.      ClonazePAM 0.5 MG Oral Tab   4    TraZODone HCl (DESYREL) 50 MG Oral Tab 1 tablet (50 mg total) nightly.      guaiFENesin-codeine (CHERATUSSIN AC) 100-10 MG/5ML Oral Solution Take 5 mL by mouth every 6 (six) hours as needed. (Patient not taking: Reported on 10/7/2024) 100 mL 0    estradiol 0.1 MG/GM Vaginal Cream Place 0.5 g vaginally daily. Use nightly for 2 weeks then every Monday / Thursday night (Patient not taking: Reported on 11/2/2023) 42.5 g 1      Past Medical History:    Abnormal mammogram    Abnormal mammogram    aspiration  of breast cyst     ACL tear    1998 R knee arthroscopy    Anxiety state, unspecified    drug therapy    Breast cyst    2008 abnormal mammogram, cyst aspiration via u/s    Breast cyst    abnormal mammogram, aspiration of breast cyst    Depression    drug therapy    Esophagitis    Gastritis    2010 EGD with biopsy    Irregular menstrual cycle    2012 neg endometrial biopsy    Irregular menstrual cycle    2008 neg endometrial biopsy    Rectal bleed     chronic intermittent rectal bleed, never did colonoscopy      Past Surgical History:   Procedure Laterality Date    Biopsy of uterus lining      neg endometrial biopsy, irregular menstrual cycle    Biopsy of uterus lining      neg endometrial biopsy, irregular menstrual cycle                Colonoscopy  2010    Cyst aspiration right      Knee arthroscopy Right 1998    R knee arthroscopy    Mammogram, both breasts  2008    abnormal mammogram, breast cyst    Mammogram, both breasts  2006    abnormal mammogram, aspiration of breast cyst    Punc/aspir breast cyst  2006    aspiraton of breast cyst [APPROACH NOT STATED], abnormal mammogram    Upper gi endoscopy,biopsy      EGD with biopsy    Us breast cyst aspiration (cpt=[id number])  2008    cyst aspiration via u/s, abnormal mammogram      Family History   Problem Relation Age of Onset    Heart Disease Paternal Grandmother         CAD    Diabetes Paternal Grandmother     Hypertension Paternal Grandmother     Lipids Paternal Grandmother         hyperlipidemia    Breast Cancer Neg     Ovarian Cancer Neg       Social History:   Social History     Socioeconomic History    Marital status:    Tobacco Use    Smoking status: Never     Passive exposure: Never    Smokeless tobacco: Never   Vaping Use    Vaping status: Never Used   Substance and Sexual Activity    Alcohol use: Yes     Comment: 2-3 times per month.    Drug use: No   Other Topics Concern    Caffeine Concern Yes     Comment: coffee, 2 cups daily    Exercise No          REVIEW OF SYSTEMS:   Negative, except per HPI.     EXAM:   /84 (BP Location: Left arm, Patient Position: Sitting, Cuff Size: large)   Pulse 70   Wt 154 lb (69.9 kg)   Breastfeeding No   BMI 32.19 kg/m²     GENERAL: well developed, well nourished, in no apparent distress  SKIN: no rashes, no suspicious lesions  HEENT: atraumatic, normocephalic, ears are clear  EYES: PERRLA, EOMI,conjunctiva  are clear  NECK: supple, no adenopathy  LUNGS: clear to auscultation  CARDIO: RRR without murmur  GI: good BS, no masses or tenderness  MUSCULOSKELETAL: back is not tender, FROM of the back  EXTREMITIES: no cyanosis or edema  NEURO: Oriented times three, cranial nerves are intact, motor and sensory are grossly intact    ASSESSMENT AND PLAN:   Mckenzie Winchester is a 59 year old female who presents for a complete physical exam.    1. Encounter for annual health examination  -Medical, surgical and social history, as well as medications and allergies were reviewed with patient.  Other labs done at work reviewed, will send to media for scanning  - TSH W Reflex To Free T4 [E]; Future  - Hemoglobin A1C; Future    2. Hypercholesterolemia  - off ezetimibe dt se  - atorvastatin 10 MG Oral Tab; Take 1 tablet (10 mg total) by mouth nightly.  Dispense: 90 tablet; Refill: 3       Fara Mcclure MD  10/7/2024  12:49 PM

## 2024-10-19 DIAGNOSIS — I10 ESSENTIAL HYPERTENSION: ICD-10-CM

## 2024-10-22 RX ORDER — HYDROCHLOROTHIAZIDE 12.5 MG/1
12.5 TABLET ORAL DAILY
Qty: 90 TABLET | Refills: 3 | Status: SHIPPED | OUTPATIENT
Start: 2024-10-22

## 2024-10-22 RX ORDER — TELMISARTAN 40 MG/1
40 TABLET ORAL DAILY
Qty: 90 TABLET | Refills: 3 | Status: SHIPPED | OUTPATIENT
Start: 2024-10-22

## 2024-10-22 NOTE — TELEPHONE ENCOUNTER
Please review; protocol failed/No Protocol    Information per Lab result scan on 10/11/2024  Last CMP 09/26/2024  GFR 94      Requested Prescriptions   Pending Prescriptions Disp Refills    HYDROCHLOROTHIAZIDE 12.5 MG Oral Tab [Pharmacy Med Name: Hydrochlorothiazide 12.5 Mg Tab Acta] 90 tablet 0     Sig: TAKE 1 TABLET BY MOUTH DAILY.       Hypertension Medications Protocol Failed - 10/22/2024 12:17 PM        Failed - CMP or BMP in past 12 months        Failed - Last BP reading less than 140/90     BP Readings from Last 1 Encounters:   10/07/24 153/84               Failed - EGFRCR or GFRNAA > 50     GFR Evaluation            Passed - In person appointment or virtual visit in the past 12 mos or appointment in next 3 mos     Recent Outpatient Visits              2 weeks ago Encounter for annual health examination    [facility], Fara Cartagena MD    Office Visit    2 months ago Respiratory infection    [facility]    [facility]    4 months ago [facility]    [facility]    9 months ago Breast cancer screening by mammogram    [facility], Aurelio Figueroa APRN    Office Visit    11 months ago Encounter for annual health examination    [facility], Fara Cartagena MD    Office Visit                        TELMISARTAN 40 MG Oral Tab [Pharmacy Med Name: Telmisartan 40 Mg Tab Ryan] 90 tablet 0     Sig: Take 1 tablet (40 mg total) by mouth daily.       Hypertension Medications Protocol Failed - 10/22/2024 12:17 PM        Failed - CMP or BMP in past 12 months        Failed - Last BP reading less than 140/90     BP Readings from Last 1 Encounters:   10/07/24 153/84               Failed - EGFRCR or GFRNAA > 50     GFR Evaluation            Passed - In person  appointment or virtual visit in the past 12 mos or appointment in next 3 mos     Recent Outpatient Visits              2 weeks ago [facility] annual health examination    [facility]    Office Visit    2 months ago Respiratory infection    [facility]    Office Visit    4 months ago Right ear pain    [facility] [place] Laura Beth, MD    Office Visit    9 months ago Breast cancer screening by mammogram    [facility] [place] Patrick, APRN    Office Visit    11 months ago Encounter for annual health examination    [facility] [place] Anastasia, MD    Office Visit                           Recent Outpatient Visits              2 weeks ago Encounter for annual health examination    [facility]ast[facility]    Office Visit    2 months ago Respiratory infection    [facility] [place] Anastasia, MD    Office Visit    4 months ago Right ear pain    [facility] [place] Laura Beth, MD    Office Visit    9 months ago Breast cancer screening by mammogram    [facility] Patrick, APRN    Office Visit    11 months ago Encounter for annual health examination    [facility]    Office Visit

## 2024-12-12 NOTE — TELEPHONE ENCOUNTER
LOV: 2/6/18 Last Rx: 5/20/15    No protocol     Please advise in regards to refill request. Thank You
Order TSH diagnosis hypothyroidism
done

## 2024-12-19 ENCOUNTER — TELEMEDICINE (OUTPATIENT)
Dept: FAMILY MEDICINE CLINIC | Facility: CLINIC | Age: 59
End: 2024-12-19
Payer: COMMERCIAL

## 2024-12-19 ENCOUNTER — NURSE TRIAGE (OUTPATIENT)
Dept: FAMILY MEDICINE CLINIC | Facility: CLINIC | Age: 59
End: 2024-12-19

## 2024-12-19 DIAGNOSIS — R05.1 ACUTE COUGH: Primary | ICD-10-CM

## 2024-12-19 PROCEDURE — 99214 OFFICE O/P EST MOD 30 MIN: CPT | Performed by: FAMILY MEDICINE

## 2024-12-19 RX ORDER — AZITHROMYCIN 250 MG/1
TABLET, FILM COATED ORAL
Qty: 6 TABLET | Refills: 0 | Status: SHIPPED | OUTPATIENT
Start: 2024-12-19 | End: 2024-12-24

## 2024-12-19 RX ORDER — CODEINE PHOSPHATE AND GUAIFENESIN 10; 100 MG/5ML; MG/5ML
5 SOLUTION ORAL EVERY 6 HOURS PRN
Qty: 100 ML | Refills: 0 | Status: SHIPPED | OUTPATIENT
Start: 2024-12-19

## 2024-12-19 NOTE — PROGRESS NOTES
Virtual Telephone Check-In    Mckenzie Bee verbally consents to a Virtual/Telephone Check-In service on 12/19/24.    Patient understands and accepts financial responsibility for any deductible, co-insurance and/or co-pays associated with this service.    Duration of the service: 15 minutes  This telemedicine visit was conducted using live audio and video.     Summary of topics discussed:     Sore throat and cough are the worst symptoms.   Had a flulike illness prior to recurrent symptoms starting again this last week.  Feels a lot of phlegm in her throat and having difficulty expectorating.  Having difficulty sleeping at night.    Physical Exam:  General: alert, speaking in full sentences, no acute distress  Lungs: respirations sound unlabored, no audible wheezing with speaking.  Neurologic: alert, oriented x3    Assessment and plan:    1. Acute cough  - discussed supportive care, humidifier use, steam from the shower, teas and broths to help thin out mucous. Salt water gargles for throat pain. Tylenol PRN for pain and fever.   - azithromycin 250 MG Oral Tab; Take 2 tablets (500 mg total) by mouth daily for 1 day, THEN 1 tablet (250 mg total) daily for 4 days.  Dispense: 6 tablet; Refill: 0  - guaiFENesin-codeine 100-10 MG/5ML Oral Solution; Take 5 mL by mouth every 6 (six) hours as needed.  Dispense: 100 mL; Refill: 0      Advised to call back clinic if no improvement in symptoms. Red flags discussed to go to ER.     Fara Mcclure MD

## 2024-12-19 NOTE — TELEPHONE ENCOUNTER
Action Requested: Summary for Provider     []  Critical Lab, Recommendations Needed  [] Need Additional Advice  []   FYI    []   Need Orders  [] Need Medications Sent to Pharmacy  []  Other     SUMMARY: video appt made, cough x 3 weeks , taking Mucinex,cough ,drops, inhaler, no relief, clearing throat a lot during call, no coughing , advised steam tx     Reason for call: Cough  Onset: 3 weeks                    Reason for Disposition   Cough with no complications    Protocols used: Cough-A-OH

## 2025-01-27 DIAGNOSIS — E03.9 ACQUIRED HYPOTHYROIDISM: ICD-10-CM

## 2025-01-30 RX ORDER — LEVOTHYROXINE SODIUM 50 UG/1
50 TABLET ORAL DAILY
Qty: 90 TABLET | Refills: 3 | Status: SHIPPED | OUTPATIENT
Start: 2025-01-30

## 2025-01-30 NOTE — TELEPHONE ENCOUNTER
Passed protocol.       Requested Prescriptions   Pending Prescriptions Disp Refills    LEVOTHYROXINE 50 MCG Oral Tab [Pharmacy Med Name: Levothyroxine Sodium 50 Mcg Tab Lupi] 90 tablet 0     Sig: Take one tablet by mouth daily       Thyroid Medication Protocol Passed - 1/30/2025  9:19 AM        Passed - TSH in past 12 months        Passed - Last TSH value is normal     Lab Results   Component Value Date    TSH 2.170 10/07/2024                 Passed - In person appointment or virtual visit in the past 12 mos or appointment in next 3 mos     Recent Outpatient Visits              1 month ago Acute cough    [facility], Fara Cartagena MD    Telemedicine    3 months ago [facility] health examination    [facility]    [facility]    5 months ago [facility]piratory infection    [facility]    [facility]    7 months ago Right ear pain    [facility]    Office Visit    1 year ago Breast cancer screening by mammogram    [facility]    Office Visit                      Passed - Medication is active on med list             Recent Outpatient Visits              1 month ago Acute cough    [facility], Fara Cartagena MD    Telemedicine    3 months ago Encounter for annual health examination    [facility]    Office Visit    5 months ago Respiratory infection    [facility]    Office Visit    7 months ago Right ear pain    [facility]    [facility] Visit    1 year ago Breast cancer screening by mammogram    [facility], Aurelio Figueroa, GINGER    Office Visit

## 2025-03-11 ENCOUNTER — HOSPITAL ENCOUNTER (OUTPATIENT)
Dept: MAMMOGRAPHY | Age: 60
Discharge: HOME | End: 2025-03-11
Attending: FAMILY MEDICINE
Payer: COMMERCIAL

## 2025-03-11 DIAGNOSIS — Z12.31 BREAST CANCER SCREENING BY MAMMOGRAM: ICD-10-CM

## 2025-03-11 PROCEDURE — 77067 SCR MAMMO BI INCL CAD: CPT | Performed by: FAMILY MEDICINE

## 2025-03-11 PROCEDURE — 77063 BREAST TOMOSYNTHESIS BI: CPT | Performed by: FAMILY MEDICINE

## 2025-04-21 ENCOUNTER — NURSE TRIAGE (OUTPATIENT)
Dept: FAMILY MEDICINE CLINIC | Facility: CLINIC | Age: 60
End: 2025-04-21

## 2025-04-21 ENCOUNTER — TELEPHONE (OUTPATIENT)
Dept: FAMILY MEDICINE CLINIC | Facility: CLINIC | Age: 60
End: 2025-04-21

## 2025-04-21 DIAGNOSIS — J98.8 RESPIRATORY INFECTION: ICD-10-CM

## 2025-04-21 NOTE — TELEPHONE ENCOUNTER
Action Requested: Summary for Provider     []  Critical Lab, Recommendations Needed  [x] Need Additional Advice  []   FYI    []   Need Orders  [] Need Medications Sent to Pharmacy  []  Other     SUMMARY: Disposition per protocol  is to be seen in office today or tomorrow.  Patient declines.     Intermittent Productive cough  and fatigue started last week.   Denies fever  or other symptoms.    Dr Mcclure is out of office today. Message also sent to pod mate GINGER Mcpherson.    Dr Mcclrue or GINGER Mcpherson Please Advise:  Please see 4/21/25 refill request note requesting refill of Cheratussin and Z pack prescribed by Dr Mcclure at 12/19/2 telemdicine visit.  Patient vehemently declined office visit or Telemedicine appointment and requests prescription refills.    Reason for call: Cough  Onset: last week    Patient transferred from call center, name and date of birth verified. Reviewed care advice including  push fluids, over the counter cough syrup, call back if cough and fatigue worsen or if new symptoms develop. Patient verbalizes understanding.   Reason for Disposition   Continuous (nonstop) coughing interferes with work or school and no improvement using cough treatment per Care Advice    Protocols used: Cough-A-OH

## 2025-04-21 NOTE — TELEPHONE ENCOUNTER
Patient returned call. Verified name and date of birth.  Patient informed of Aurelio Mcpherson's message.  Verbalized understanding.  Patient states she will call back to make an appointment.

## 2025-04-22 ENCOUNTER — OFFICE VISIT (OUTPATIENT)
Dept: FAMILY MEDICINE CLINIC | Facility: CLINIC | Age: 60
End: 2025-04-22

## 2025-04-22 ENCOUNTER — E-VISIT (OUTPATIENT)
Dept: TELEHEALTH | Age: 60
End: 2025-04-22
Payer: COMMERCIAL

## 2025-04-22 VITALS
HEART RATE: 72 BPM | TEMPERATURE: 97 F | SYSTOLIC BLOOD PRESSURE: 138 MMHG | DIASTOLIC BLOOD PRESSURE: 90 MMHG | BODY MASS INDEX: 32.54 KG/M2 | HEIGHT: 58 IN | WEIGHT: 155 LBS | OXYGEN SATURATION: 97 %

## 2025-04-22 DIAGNOSIS — R05.8 PRODUCTIVE COUGH: Primary | ICD-10-CM

## 2025-04-22 DIAGNOSIS — J22 ACUTE RESPIRATORY INFECTION: Primary | ICD-10-CM

## 2025-04-22 PROCEDURE — 3008F BODY MASS INDEX DOCD: CPT

## 2025-04-22 PROCEDURE — 99213 OFFICE O/P EST LOW 20 MIN: CPT

## 2025-04-22 PROCEDURE — 3075F SYST BP GE 130 - 139MM HG: CPT

## 2025-04-22 PROCEDURE — 3080F DIAST BP >= 90 MM HG: CPT

## 2025-04-22 PROCEDURE — 99421 OL DIG E/M SVC 5-10 MIN: CPT | Performed by: PHYSICIAN ASSISTANT

## 2025-04-22 RX ORDER — AZITHROMYCIN 250 MG/1
TABLET, FILM COATED ORAL
Qty: 6 TABLET | Refills: 0 | Status: SHIPPED | OUTPATIENT
Start: 2025-04-22 | End: 2025-04-27

## 2025-04-22 RX ORDER — FLUTICASONE PROPIONATE 50 MCG
1 SPRAY, SUSPENSION (ML) NASAL DAILY
Qty: 16 G | Refills: 0 | Status: SHIPPED | OUTPATIENT
Start: 2025-04-22

## 2025-04-22 RX ORDER — BENZONATATE 200 MG/1
200 CAPSULE ORAL 3 TIMES DAILY PRN
Qty: 30 CAPSULE | Refills: 0 | Status: SHIPPED | OUTPATIENT
Start: 2025-04-22

## 2025-04-22 NOTE — PROGRESS NOTES
Subjective:   Mckenzie Falcon is a 60 year old female who presents for Cough (Pt has been having a cough with exessive green pleghm for 1 week. ).     HPI     Patient presents with productive cough for the past 1 week. She is having fatigue and headaches. Patient denies fever, sob, chills, congestion, ear pain, postnasal drip, sinus pain/pressure, chest pain, palpitations, abdominal pain, diarrhea, nausea or vomiting.     Patient blood pressure start of visit was 156/99. Recheck bp was 138/90. Patient has a history of htn and states that she did not take her medication today.     History/Other:      Chief Complaint Reviewed and Verified  Nursing Notes Reviewed and   Verified  Tobacco Reviewed  Allergies Reviewed  Medications Reviewed    Problem List Reviewed  Medical History Reviewed  Surgical History   Reviewed  OB Status Reviewed  Family History Reviewed  Social History   Reviewed           Tobacco:  She has never smoked tobacco.      Current Medications[1]    Allergies[2]       Review of Systems   Constitutional: Negative.  Negative for activity change and fatigue.   HENT: Negative.  Negative for congestion, ear pain, rhinorrhea and sneezing.    Eyes: Negative.  Negative for redness.   Respiratory:  Positive for cough. Negative for shortness of breath and wheezing.    Cardiovascular: Negative.  Negative for chest pain.   Gastrointestinal: Negative.  Negative for abdominal pain, constipation, diarrhea, nausea and vomiting.   Endocrine: Negative.    Genitourinary: Negative.  Negative for difficulty urinating and frequency.   Musculoskeletal: Negative.  Negative for back pain, joint swelling and myalgias.   Skin: Negative.  Negative for rash.   Allergic/Immunologic: Negative.    Neurological:  Positive for headaches. Negative for dizziness, syncope and light-headedness.   Hematological: Negative.    Psychiatric/Behavioral: Negative.           Objective:   /90   Pulse 72   Temp 97.2 °F (36.2 °C)  (Temporal)   Ht 4' 10\" (1.473 m)   Wt 155 lb (70.3 kg)   SpO2 97%   BMI 32.40 kg/m²  Estimated body mass index is 32.4 kg/m² as calculated from the following:    Height as of this encounter: 4' 10\" (1.473 m).    Weight as of this encounter: 155 lb (70.3 kg).      Physical Exam  Vitals and nursing note reviewed.   Constitutional:       Appearance: Normal appearance. She is normal weight.   HENT:      Head: Normocephalic and atraumatic.      Right Ear: Tympanic membrane normal.      Left Ear: Tympanic membrane normal.      Nose: Nose normal.      Mouth/Throat:      Mouth: Mucous membranes are moist.      Pharynx: Oropharynx is clear.   Eyes:      Extraocular Movements: Extraocular movements intact.      Conjunctiva/sclera: Conjunctivae normal.      Pupils: Pupils are equal, round, and reactive to light.   Cardiovascular:      Rate and Rhythm: Normal rate and regular rhythm.      Pulses: Normal pulses.      Heart sounds: Normal heart sounds.   Pulmonary:      Effort: Pulmonary effort is normal.      Breath sounds: Normal breath sounds.   Abdominal:      General: Abdomen is flat. Bowel sounds are normal.      Palpations: Abdomen is soft.   Musculoskeletal:         General: Normal range of motion.      Cervical back: Normal range of motion and neck supple.   Skin:     General: Skin is warm and dry.   Neurological:      General: No focal deficit present.      Mental Status: She is alert and oriented to person, place, and time. Mental status is at baseline.   Psychiatric:         Mood and Affect: Mood normal.         Behavior: Behavior normal.         Thought Content: Thought content normal.         Judgment: Judgment normal.           Assessment & Plan:     Assessment & Plan  Acute respiratory infection  -Advised to take medication as directed  -Drink plenty of fluids to stay hydrated - at least 2 liters per day   -Wash hands frequently, cover your cough or sneeze, do not share drinks with others.  -Cepacol lozenges,  Chloroseptic throat spray, and salt water gargles (1 tsp salt in 8 oz lukewarm water) may help with throat pain and swelling  -Humidifier, steam, and Vicks as needed  -OTC Nasacort or Flonase (steroid nasal spray) - 2 sprays in each nostril once daily if needed for severe nasal congestion and post nasal drip.  -Tylenol or Ibuprofen as needed for any pain or fever  -May use over the counter antitussives like Robitussin or Delsym as needed for cough     Orders:    azithromycin (ZITHROMAX Z-JHONY) 250 MG Oral Tab; Take 2 tablets (500 mg total) by mouth daily for 1 day, THEN 1 tablet (250 mg total) daily for 4 days.    fluticasone propionate 50 MCG/ACT Nasal Suspension; 1 spray by Nasal route daily. One spray per each nostril daily.    benzonatate 200 MG Oral Cap; Take 1 capsule (200 mg total) by mouth 3 (three) times daily as needed for cough.           Medication use, effects and side effects discussed in detail with patient. The patient indicated understanding of the diagnosis and agreed with the plan of care.    Return if symptoms worsen or fail to improve.    GINGER Bruner       [1]   Current Outpatient Medications   Medication Sig Dispense Refill    fluticasone propionate 50 MCG/ACT Nasal Suspension 1 spray by Nasal route daily. One spray per each nostril daily. 16 g 0    benzonatate 200 MG Oral Cap Take 1 capsule (200 mg total) by mouth 3 (three) times daily as needed for cough. 30 capsule 0    levothyroxine 50 MCG Oral Tab Take 1 tablet (50 mcg total) by mouth daily. 90 tablet 3    hydroCHLOROthiazide 12.5 MG Oral Tab Take 1 tablet (12.5 mg total) by mouth daily. 90 tablet 3    telmisartan 40 MG Oral Tab Take 1 tablet (40 mg total) by mouth daily. 90 tablet 3    atorvastatin 10 MG Oral Tab Take 1 tablet (10 mg total) by mouth nightly. 90 tablet 3    triamcinolone 0.1 % External Cream Apply topically 3 (three) times daily. 15 g 0    sertraline 100 MG Oral Tab Take 1 tablet (100 mg total) by mouth every  morning.      ClonazePAM 0.5 MG Oral Tab   4    TraZODone HCl (DESYREL) 50 MG Oral Tab 1 tablet (50 mg total) nightly.      estradiol 0.1 MG/GM Vaginal Cream Place 0.5 g vaginally daily. Use nightly for 2 weeks then every Monday / Thursday night (Patient not taking: Reported on 4/22/2025) 42.5 g 1   [2] No Known Allergies

## 2025-04-22 NOTE — PROGRESS NOTES
Mckenzie Falcon is a 60 year old female who initiated e-visit care today.    HPI:   See answers to questionnaire submission     Current Medications[1]   Past Medical History[2]   Past Surgical History[3]   Family History[4]   Social History:  Short Social Hx on File[5]      ASSESSMENT AND PLAN:       Diagnoses and all orders for this visit:    Productive cough       Needs in-person exam to assess lungs.     Duration of  the service:  3 minutes      See Pointt message exchange and Patient Instructions for Comfort Care and patient education.          [1]   Current Outpatient Medications   Medication Sig Dispense Refill    levothyroxine 50 MCG Oral Tab Take 1 tablet (50 mcg total) by mouth daily. 90 tablet 3    hydroCHLOROthiazide 12.5 MG Oral Tab Take 1 tablet (12.5 mg total) by mouth daily. 90 tablet 3    telmisartan 40 MG Oral Tab Take 1 tablet (40 mg total) by mouth daily. 90 tablet 3    atorvastatin 10 MG Oral Tab Take 1 tablet (10 mg total) by mouth nightly. 90 tablet 3    triamcinolone 0.1 % External Cream Apply topically 3 (three) times daily. 15 g 0    estradiol 0.1 MG/GM Vaginal Cream Place 0.5 g vaginally daily. Use nightly for 2 weeks then every Monday / Thursday night (Patient not taking: Reported on 11/2/2023) 42.5 g 1    sertraline 100 MG Oral Tab Take 1 tablet (100 mg total) by mouth every morning.      ClonazePAM 0.5 MG Oral Tab   4    TraZODone HCl (DESYREL) 50 MG Oral Tab 1 tablet (50 mg total) nightly.     [2]   Past Medical History:   Abnormal mammogram    Abnormal mammogram    aspiration  of breast cyst     ACL tear    1998 R knee arthroscopy    Anxiety state, unspecified    drug therapy    Breast cyst    2008 abnormal mammogram, cyst aspiration via u/s    Breast cyst    abnormal mammogram, aspiration of breast cyst    Depression    drug therapy    Esophagitis    Gastritis    2010 EGD with biopsy    Irregular menstrual cycle    2012 neg endometrial biopsy    Irregular menstrual cycle    2008 neg  endometrial biopsy    Rectal bleed    chronic intermittent rectal bleed, never did colonoscopy   [3]   Past Surgical History:  Procedure Laterality Date    Biopsy of uterus lining      neg endometrial biopsy, irregular menstrual cycle    Biopsy of uterus lining      neg endometrial biopsy, irregular menstrual cycle                Colonoscopy      Cyst aspiration right      Knee arthroscopy Right 1998    R knee arthroscopy    Mammogram, both breasts  2008    abnormal mammogram, breast cyst    Mammogram, both breasts  2006    abnormal mammogram, aspiration of breast cyst    Punc/aspir breast cyst  2006    aspiraton of breast cyst [APPROACH NOT STATED], abnormal mammogram    Upper gi endoscopy,biopsy      EGD with biopsy    Us breast cyst aspiration (cpt=76942/37634)  2008    cyst aspiration via u/s, abnormal mammogram   [4]   Family History  Problem Relation Age of Onset    Heart Disease Paternal Grandmother         CAD    Diabetes Paternal Grandmother     Hypertension Paternal Grandmother     Lipids Paternal Grandmother         hyperlipidemia    Breast Cancer Neg     Ovarian Cancer Neg    [5]   Social History  Socioeconomic History    Marital status:    Tobacco Use    Smoking status: Never     Passive exposure: Never    Smokeless tobacco: Never   Vaping Use    Vaping status: Never Used   Substance and Sexual Activity    Alcohol use: Yes     Comment: 2-3 times per month.    Drug use: No   Other Topics Concern    Caffeine Concern Yes     Comment: coffee, 2 cups daily    Exercise No

## 2025-04-24 RX ORDER — CODEINE PHOSPHATE AND GUAIFENESIN 10; 100 MG/5ML; MG/5ML
5 SOLUTION ORAL EVERY 6 HOURS PRN
Qty: 100 ML | Refills: 0 | OUTPATIENT
Start: 2025-04-24

## 2025-04-24 NOTE — TELEPHONE ENCOUNTER
Request for refill was made 4/21/25  Patient seen in clinic 4/22/25 and symptoms addressed and meds given  See office visit notes for details.     Seen by Aida MILES     Closing encounter.

## 2025-04-24 NOTE — TELEPHONE ENCOUNTER
Please contact patient to discuss symptoms that patient described in patient comment.     Patient Comment: I have this cough with flehm back I stated to have this weird cough started last week. please if you can refill i will just in office less than 6 weeks. I'd also like to ask for a Z-pack, need to get better by this weekend been fighting this all last week

## 2025-05-20 ENCOUNTER — NURSE TRIAGE (OUTPATIENT)
Dept: FAMILY MEDICINE CLINIC | Facility: CLINIC | Age: 60
End: 2025-05-20

## 2025-05-20 ENCOUNTER — PATIENT MESSAGE (OUTPATIENT)
Dept: FAMILY MEDICINE CLINIC | Facility: CLINIC | Age: 60
End: 2025-05-20

## 2025-05-20 DIAGNOSIS — R09.89 PHLEGM IN THROAT: Primary | ICD-10-CM

## 2025-05-20 NOTE — TELEPHONE ENCOUNTER
Relayed Dr message to Pt , Pt stated the Dr should review the recent OV, this has been ongoing , happens too often and that's why she wants to see a specialist

## 2025-05-20 NOTE — TELEPHONE ENCOUNTER
Would recommend virtual visit this week to better delineate the time course of the cough.  Phlegm for a few weeks after an upper respiratory infection is not uncommon.

## 2025-05-20 NOTE — TELEPHONE ENCOUNTER
Left message to patient to call back.   Also Climateminderhart message with MD recommendation sent to pt.       No future appointments.

## 2025-05-20 NOTE — TELEPHONE ENCOUNTER
Action Requested: Summary for Provider     []  Critical Lab, Recommendations Needed  [x] Need Additional Advice  []   FYI    []   Need Orders  [] Need Medications Sent to Pharmacy  []  Other     SUMMARY: Patient states she has been dealing with cough for a long time. Was prescribed antibiotics, nasal spray and completed. Denies fever, chest pain, shortness of breath, wheezing. Denies sinus pain or congestion. Patient states she has to keep clearing her throat, then cough.  Patient is already taking Claritin daily and Flonase  Patient asking to see a specialist. Please advise.      Reason for call: Cough  Onset: Data Unavailable      Mckenzie MORLEY Em Rn Triage (supporting Fraa Mcclure MD)5 hours ago (7:17 AM)     GD  Good Morning,     I have had a cough for months now. I did visit doctor April and getting antibiotics and cough meds.  I think i was better 2 days and that was the past weekend. its a dry cough and I  have flehm pretty much all the time.  I always have to clear my throat. I feel something stuck in my throat. i don't think i am sick. It may be allergies. Never had allergies.   I never smoked a day in my life, I've been around a lot of people who have.  I have had this issue years and years my of life. I get bronchitis.  I was wondering if what doctor I should see? Allergists? lung Doctor?  I want to see what is actually the problem before i get cancer or whatever  If you can please let me know.  Reason for Disposition   Patient wants to be seen    Protocols used: Cough - Chronic-A-OH

## 2025-05-23 DIAGNOSIS — J22 ACUTE RESPIRATORY INFECTION: ICD-10-CM

## 2025-05-23 NOTE — TELEPHONE ENCOUNTER
Patient stated that she still has a cough with phlegm has had it for over 1 month now. Saw Aida Arias on 4/22/2025 and was prescribed medication, which she finished. Had about 2 good days after that but otherwise symptoms were still the same. Last 2 days her  indicated that she has been wheezing. No chest pain. No fevers. Fatigued. No other symptoms. Not wheezing currently. Wanted to only see Dr Mcclure today. Declined other providers or urgent care. Please advise if patient can be added on?    Advised patient that if symptoms worse to go to urgent care/emergency room for an evaluation. Patient agreed.

## 2025-05-23 NOTE — TELEPHONE ENCOUNTER
Patient calling back for update.  Advised Dr Mcclure has opening tomorrow and patient agrees to schedule. Office visit scheduled 5/24/25

## 2025-05-24 ENCOUNTER — HOSPITAL ENCOUNTER (OUTPATIENT)
Dept: GENERAL RADIOLOGY | Age: 60
Discharge: HOME | End: 2025-05-24
Attending: FAMILY MEDICINE
Payer: COMMERCIAL

## 2025-05-24 ENCOUNTER — OFFICE VISIT (OUTPATIENT)
Dept: FAMILY MEDICINE CLINIC | Facility: CLINIC | Age: 60
End: 2025-05-24

## 2025-05-24 VITALS
SYSTOLIC BLOOD PRESSURE: 125 MMHG | DIASTOLIC BLOOD PRESSURE: 85 MMHG | BODY MASS INDEX: 32 KG/M2 | HEART RATE: 73 BPM | WEIGHT: 153 LBS

## 2025-05-24 DIAGNOSIS — R05.3 PERSISTENT COUGH FOR 3 WEEKS OR LONGER: ICD-10-CM

## 2025-05-24 DIAGNOSIS — R05.3 PERSISTENT COUGH FOR 3 WEEKS OR LONGER: Primary | ICD-10-CM

## 2025-05-24 DIAGNOSIS — J22 ACUTE RESPIRATORY INFECTION: ICD-10-CM

## 2025-05-24 PROCEDURE — 3074F SYST BP LT 130 MM HG: CPT | Performed by: FAMILY MEDICINE

## 2025-05-24 PROCEDURE — 71046 X-RAY EXAM CHEST 2 VIEWS: CPT | Performed by: FAMILY MEDICINE

## 2025-05-24 PROCEDURE — 3079F DIAST BP 80-89 MM HG: CPT | Performed by: FAMILY MEDICINE

## 2025-05-24 PROCEDURE — 99214 OFFICE O/P EST MOD 30 MIN: CPT | Performed by: FAMILY MEDICINE

## 2025-05-24 RX ORDER — HYDROCODONE BITARTRATE AND ACETAMINOPHEN 5; 325 MG/1; MG/1
1 TABLET ORAL 2 TIMES DAILY
COMMUNITY
Start: 2025-05-20

## 2025-05-24 RX ORDER — CODEINE PHOSPHATE AND GUAIFENESIN 10; 100 MG/5ML; MG/5ML
5 SOLUTION ORAL EVERY 6 HOURS PRN
Qty: 100 ML | Refills: 0 | Status: SHIPPED | OUTPATIENT
Start: 2025-05-24

## 2025-05-24 RX ORDER — BENZONATATE 200 MG/1
200 CAPSULE ORAL 3 TIMES DAILY PRN
Qty: 30 CAPSULE | Refills: 0 | Status: SHIPPED | OUTPATIENT
Start: 2025-05-24

## 2025-05-24 NOTE — PROGRESS NOTES
Chief Complaint   Patient presents with    Cough     C/o continued cough unrelieved by benzonatate      HPI:   Mckenzie Falcon is a 60 year old female who presents to clinic with complaints of persistent cough and congestion.   Was given antibiotics which only helped for short time.    Denies any sinus pain or congestion.    No fevers, chest pain, shortness of breath or wheezing.  She is on Claritin and Flonase.    REVIEW OF SYSTEMS:   Negative, except per HPI.     EXAM:   /85   Pulse 73   Wt 153 lb (69.4 kg)   BMI 31.98 kg/m²   Body mass index is 31.98 kg/m².  GENERAL: well developed, well nourished, in no apparent distress  SKIN: no rashes, no suspicious lesions  HEENT: atraumatic, normocephalic  EYES: PERRLA, EOMI,conjunctiva are clear  NECK: supple, no adenopathy  LUNGS: clear to auscultation  CARDIO: RRR without murmur    ASSESSMENT AND PLAN:     1. Persistent cough for 3 weeks or longer    - XR CHEST PA + LAT CHEST (CPT=71046); Future  - guaiFENesin-codeine 100-10 MG/5ML Oral Solution; Take 5 mL by mouth every 6 (six) hours as needed.  Dispense: 100 mL; Refill: 0  - benzonatate 200 MG Oral Cap; Take 1 capsule (200 mg total) by mouth 3 (three) times daily as needed for cough.  Dispense: 30 capsule; Refill: 0  - Pulmonary Referral - In Network    2. Acute respiratory infection  - discussed supportive care, humidifier use, steam from the shower, teas and broths to help thin out mucous. Salt water gargles for throat pain. Tylenol PRN for pain and fever.   - XR CHEST PA + LAT CHEST (CPT=71046); Future  - guaiFENesin-codeine 100-10 MG/5ML Oral Solution; Take 5 mL by mouth every 6 (six) hours as needed.  Dispense: 100 mL; Refill: 0  - benzonatate 200 MG Oral Cap; Take 1 capsule (200 mg total) by mouth 3 (three) times daily as needed for cough.  Dispense: 30 capsule; Refill: 0  - Pulmonary Referral - In Network     RTC if no improvement in symptoms. Red flags discussed to go to ER.     Fara Mcclure,  MD  5/24/2025  11:45 AM

## 2025-05-26 RX ORDER — BENZONATATE 200 MG/1
200 CAPSULE ORAL 3 TIMES DAILY PRN
Qty: 30 CAPSULE | Refills: 0 | OUTPATIENT
Start: 2025-05-26

## 2025-05-31 ENCOUNTER — TELEPHONE (OUTPATIENT)
Dept: FAMILY MEDICINE CLINIC | Facility: CLINIC | Age: 60
End: 2025-05-31

## 2025-05-31 DIAGNOSIS — R05.3 CHRONIC COUGH: Primary | ICD-10-CM

## 2025-05-31 NOTE — TELEPHONE ENCOUNTER
Patient was on antibiotic and phlem not going away and is requesting antibiotic Dr. Mcclure recommends.   Took chest x-ray and results are negative.   Please let patient know when medication is prescribed.    [id number] - [place] [address] [address]  [phone], [phone] [[id number]]

## 2025-06-02 RX ORDER — DOXYCYCLINE 100 MG/1
100 CAPSULE ORAL 2 TIMES DAILY
Qty: 14 CAPSULE | Refills: 0 | Status: SHIPPED | OUTPATIENT
Start: 2025-06-02 | End: 2025-06-09

## 2025-06-02 NOTE — TELEPHONE ENCOUNTER
Patient calling with update, she feels over cough is improving although she is still coughing and taking cough meds, liquid at night, tabs during the day.  Green discharge coming up with cough. No blood. Denies wheezing, SOB or CP.  Patient asking for ABT, she is going out of town the 16th and would like for this to be resolved.   Patient reports she was told to call if not improving and possible ABT would be considered. Chest xray normal, no acute findings. Has not scheduled with Pulmonary, she says that was a TBD once chest xray was done and since normal she does not feel she needs this.    Please advise on ABT and if pulm consult needed.  Patient leaving [facility] 6/16/25.

## 2025-06-03 NOTE — TELEPHONE ENCOUNTER
Spoke to patient, full name and date of birth verified.  Informed patient of prescription for doxycycline sent to [facility] yesterday.   Patient appreciative, she will pick this up and start the medication.

## 2025-06-03 NOTE — TELEPHONE ENCOUNTER
1. Chronic cough    - doxycycline 100 MG Oral Cap; Take 1 capsule (100 mg total) by mouth 2 (two) times daily for 7 days.  Dispense: 14 capsule; Refill: 0

## 2025-07-09 ENCOUNTER — OFFICE VISIT (OUTPATIENT)
Dept: OBGYN CLINIC | Facility: CLINIC | Age: 60
End: 2025-07-09

## 2025-07-09 VITALS
BODY MASS INDEX: 31 KG/M2 | HEART RATE: 76 BPM | WEIGHT: 146 LBS | DIASTOLIC BLOOD PRESSURE: 89 MMHG | SYSTOLIC BLOOD PRESSURE: 138 MMHG

## 2025-07-09 DIAGNOSIS — Z01.411 ENCOUNTER FOR GYNECOLOGICAL EXAMINATION WITH ABNORMAL FINDING: Primary | ICD-10-CM

## 2025-07-09 DIAGNOSIS — Z12.31 SCREENING MAMMOGRAM FOR BREAST CANCER: ICD-10-CM

## 2025-07-09 DIAGNOSIS — R10.2 PELVIC PAIN IN FEMALE: ICD-10-CM

## 2025-07-09 DIAGNOSIS — N81.11 CYSTOCELE, MIDLINE: ICD-10-CM

## 2025-07-09 PROCEDURE — 3075F SYST BP GE 130 - 139MM HG: CPT | Performed by: OBSTETRICS & GYNECOLOGY

## 2025-07-09 PROCEDURE — 99396 PREV VISIT EST AGE 40-64: CPT | Performed by: OBSTETRICS & GYNECOLOGY

## 2025-07-09 PROCEDURE — 3079F DIAST BP 80-89 MM HG: CPT | Performed by: OBSTETRICS & GYNECOLOGY

## 2025-08-03 PROBLEM — R10.2 PELVIC PAIN IN FEMALE: Status: ACTIVE | Noted: 2025-08-03

## (undated) DIAGNOSIS — R05.9 COUGH: Primary | ICD-10-CM

## (undated) NOTE — LETTER
10/31/2018              [address] [address]         Dear Tremaine Turcios,      It was a pleasure to see you at our [address] [place] [address] office.   Your mammogram is normal. Nex

## (undated) NOTE — LETTER
7/22/2019          To Whom It May Concern:    Heidi Cancino is currently under my medical care and may  return to work with the following restriction : please allow patient to wear supportive gym shoes at work for the next month due to her foot pain.

## (undated) NOTE — MR AVS SNAPSHOT
After Visit Summary   9/26/2019    Merced Eisenmenger    MRN: [id number]           Visit Information     Date & Time  9/26/2019 11:40 AM Provider  Clive Forde MD [address] Dept.  Phone  [phone]      You Facility, call Central Scheduling at [phone], Monday through Friday between 7:30am to 6pm and on Saturday between 8am and 1pm.  Evening and weekend appointments for your exam are available. Walk-in patients are welcome for most exams.     It is the track your progress   You don’t need to join a gym. Home exercises work great. Put more priority on exercise in your life           [facility] now offers Video Visits through [address] for adult and pediatric patients.   Video Visits are available Monday - Friday Average cost  $120*       EMERGENCY ROOM         Life-threatening emergencies needing immediate intervention   at a hospital emergency room.       Average cost  $2,300*   *Cost varies based on your insurance coverage  For more information about hours, l

## (undated) NOTE — LETTER
[address] [facility]  [address], [address]  [place] [phone]        Dear Lucila Davila. Shorty, DO,      I had the pleasure of seeing your patient, Kwaku Bernal on 8/24/2017.      Below please find pregabalin 75 MG Oral Cap Take 1 capsule (75 mg total) by mouth 2 (two) times daily.  Disp: 60 capsule Rfl: 5   LOSARTAN POTASSIUM 50 MG Oral Tab TAKE 1 TABLET (50 MG) BY ORAL ROUTE ONCE DAILY Disp: 30 tablet Rfl: 11   Levothyroxine Sodium (SYNTHROID) 50 MC 7/2008: US BREAST CYST ASPIRATION (CPT=76942/79435)      Comment: cyst aspiration via u/s, abnormal mammogram   Family History   Problem Relation Age of Onset   • Heart Disease Paternal Grandmother      CAD   • Diabetes Paternal Grandmother    • Hypertensi Cranial Nerves: II-Visual acuity grossly normal, with full visual fields. Pupil react to light. Fundoscopic exam normal.  III,IV,VI- EOM full, with normal pursuit. V-Facial sensation intact, with symmetric corneal reflex. VII- face symmetric.  VIII- Audito

## (undated) NOTE — LETTER
08/28/19        [address] [address]      Dear Jacqueline Lui records indicate that you have outstanding lab work and or testing that was ordered for you and has not yet been completed:  Orders Placed This Encounter

## (undated) NOTE — LETTER
March 11, 2019         Paloma Huang DO  [place] [address]      Patient: Beth Forman   YOB: 1965   Date of Visit: 3/11/2019       Dear Dr. Natanael Shipley,    I saw your patient, Beth Forman, on 3/

## (undated) NOTE — LETTER
Dear Dr. Chandrika Meza    This letter is to inform you that Heidi Cancino has been attending Physical Therapy with me. See below for my most recent plan of care.     Diagnosis: Fall (on)(from) escalator, subsequent encounter (J31.5MQB)  Acute bilate Assessment: Advanced reps and exercises to address deficits with occasional discomfort noted while performing that would abolish when relaxing. Patient demo'd improved strength this session globally. Collaborated with PT patient response today's treatment.